# Patient Record
Sex: MALE | Race: WHITE | Employment: OTHER | URBAN - METROPOLITAN AREA
[De-identification: names, ages, dates, MRNs, and addresses within clinical notes are randomized per-mention and may not be internally consistent; named-entity substitution may affect disease eponyms.]

---

## 2017-11-01 ENCOUNTER — HOSPITAL ENCOUNTER (OUTPATIENT)
Dept: CARDIAC REHAB | Age: 72
Discharge: HOME | End: 2017-11-30
Attending: INTERNAL MEDICINE | Admitting: INTERNAL MEDICINE

## 2017-12-01 ENCOUNTER — HOSPITAL ENCOUNTER (OUTPATIENT)
Dept: CARDIAC REHAB | Age: 72
Discharge: HOME | End: 2017-12-31
Attending: INTERNAL MEDICINE | Admitting: INTERNAL MEDICINE

## 2018-07-13 ENCOUNTER — TELEPHONE (OUTPATIENT)
Dept: ORTHOPEDIC SURGERY | Age: 73
End: 2018-07-13

## 2018-07-13 PROBLEM — R63.4 WEIGHT LOSS, UNINTENTIONAL: Status: ACTIVE | Noted: 2018-07-13

## 2018-07-13 PROBLEM — R63.0 POOR APPETITE: Status: ACTIVE | Noted: 2018-07-13

## 2018-07-13 PROBLEM — R90.89 ABNORMAL CT OF BRAIN: Status: ACTIVE | Noted: 2018-07-13

## 2018-07-13 PROBLEM — R55 SYNCOPE AND COLLAPSE: Status: ACTIVE | Noted: 2018-07-13

## 2018-07-13 PROBLEM — K92.1 HEMATOCHEZIA: Status: ACTIVE | Noted: 2018-07-13

## 2018-07-13 PROBLEM — S82.892A CLOSED FRACTURE OF LEFT ANKLE: Status: ACTIVE | Noted: 2018-07-13

## 2018-07-13 PROBLEM — R42 DIZZINESS: Status: ACTIVE | Noted: 2018-07-13

## 2018-07-13 NOTE — TELEPHONE ENCOUNTER
There is a inpatient consult request for this patient at [address] [place] [address]    Consult DX: fracture of left ankle  Brandy Burrell is the RN please call [phone]    Thanks

## 2018-07-19 PROBLEM — G89.4 CHRONIC PAIN SYNDROME: Status: ACTIVE | Noted: 2018-07-19

## 2018-11-08 ENCOUNTER — HOSPITAL ENCOUNTER (OUTPATIENT)
Dept: MRI IMAGING | Age: 73
Discharge: HOME | End: 2018-11-08
Payer: OTHER GOVERNMENT

## 2018-11-08 DIAGNOSIS — H55.00 INVOLUNTARY EYE MOVEMENTS: ICD-10-CM

## 2018-11-08 PROCEDURE — 70551 MRI BRAIN STEM W/O DYE: CPT

## 2019-04-29 ENCOUNTER — HOSPITAL ENCOUNTER (OUTPATIENT)
Dept: CARDIAC CATH/INVASIVE PROCEDURES | Age: 74
Discharge: HOME | End: 2019-05-01
Attending: INTERNAL MEDICINE | Admitting: INTERNAL MEDICINE
Payer: OTHER GOVERNMENT

## 2019-04-29 ENCOUNTER — ANESTHESIA (OUTPATIENT)
Dept: CARDIAC CATH/INVASIVE PROCEDURES | Age: 74
End: 2019-04-29
Payer: OTHER GOVERNMENT

## 2019-04-29 ENCOUNTER — ANESTHESIA EVENT (OUTPATIENT)
Dept: CARDIAC CATH/INVASIVE PROCEDURES | Age: 74
End: 2019-04-29
Payer: OTHER GOVERNMENT

## 2019-04-29 VITALS
SYSTOLIC BLOOD PRESSURE: 124 MMHG | OXYGEN SATURATION: 99 % | DIASTOLIC BLOOD PRESSURE: 66 MMHG | TEMPERATURE: 97.9 F | RESPIRATION RATE: 7 BRPM

## 2019-04-29 PROBLEM — I48.0 PAF (PAROXYSMAL ATRIAL FIBRILLATION) (HCC): Status: ACTIVE | Noted: 2019-04-29

## 2019-04-29 LAB
A/G RATIO: 1.5 (ref 1.1–2.2)
ABO/RH: NORMAL
ALBUMIN SERPL-MCNC: 4.3 G/DL (ref 3.4–5)
ALP BLD-CCNC: 71 U/L (ref 40–129)
ALT SERPL-CCNC: 14 U/L (ref 10–40)
ANION GAP SERPL CALCULATED.3IONS-SCNC: 11 MMOL/L (ref 3–16)
ANTIBODY SCREEN: NORMAL
AST SERPL-CCNC: 21 U/L (ref 15–37)
BILIRUB SERPL-MCNC: 0.4 MG/DL (ref 0–1)
BUN BLDV-MCNC: 7 MG/DL (ref 7–20)
CALCIUM SERPL-MCNC: 9.1 MG/DL (ref 8.3–10.6)
CHLORIDE BLD-SCNC: 103 MMOL/L (ref 99–110)
CO2: 28 MMOL/L (ref 21–32)
CREAT SERPL-MCNC: 1.2 MG/DL (ref 0.8–1.3)
EKG ATRIAL RATE: 81 BPM
EKG DIAGNOSIS: NORMAL
EKG P-R INTERVAL: 102 MS
EKG Q-T INTERVAL: 420 MS
EKG QRS DURATION: 94 MS
EKG QTC CALCULATION (BAZETT): 487 MS
EKG R AXIS: -57 DEGREES
EKG T AXIS: 71 DEGREES
EKG VENTRICULAR RATE: 81 BPM
GFR AFRICAN AMERICAN: >60
GFR NON-AFRICAN AMERICAN: 60
GLOBULIN: 2.9 G/DL
GLUCOSE BLD-MCNC: 112 MG/DL (ref 70–99)
GLUCOSE BLD-MCNC: 151 MG/DL (ref 70–99)
HCT VFR BLD CALC: 47 % (ref 40.5–52.5)
HEMOGLOBIN: 15.7 G/DL (ref 13.5–17.5)
INR BLD: 3.13 (ref 0.86–1.14)
MAGNESIUM: 1.6 MG/DL (ref 1.8–2.4)
MCH RBC QN AUTO: 31.6 PG (ref 26–34)
MCHC RBC AUTO-ENTMCNC: 33.3 G/DL (ref 31–36)
MCV RBC AUTO: 94.7 FL (ref 80–100)
PDW BLD-RTO: 13.5 % (ref 12.4–15.4)
PERFORMED ON: ABNORMAL
PLATELET # BLD: 186 K/UL (ref 135–450)
PMV BLD AUTO: 9.2 FL (ref 5–10.5)
POC ACT LR: >400 SEC
POTASSIUM SERPL-SCNC: 4 MMOL/L (ref 3.5–5.1)
PROTHROMBIN TIME: 35.7 SEC (ref 9.8–13)
RBC # BLD: 4.97 M/UL (ref 4.2–5.9)
SODIUM BLD-SCNC: 142 MMOL/L (ref 136–145)
TOTAL PROTEIN: 7.2 G/DL (ref 6.4–8.2)
WBC # BLD: 11.7 K/UL (ref 4–11)

## 2019-04-29 PROCEDURE — 6360000002 HC RX W HCPCS

## 2019-04-29 PROCEDURE — 86900 BLOOD TYPING SEROLOGIC ABO: CPT

## 2019-04-29 PROCEDURE — 93662 INTRACARDIAC ECG (ICE): CPT

## 2019-04-29 PROCEDURE — 93613 INTRACARDIAC EPHYS 3D MAPG: CPT | Performed by: INTERNAL MEDICINE

## 2019-04-29 PROCEDURE — C1769 GUIDE WIRE: HCPCS

## 2019-04-29 PROCEDURE — 6360000002 HC RX W HCPCS: Performed by: NURSE ANESTHETIST, CERTIFIED REGISTERED

## 2019-04-29 PROCEDURE — 86901 BLOOD TYPING SEROLOGIC RH(D): CPT

## 2019-04-29 PROCEDURE — 2500000003 HC RX 250 WO HCPCS

## 2019-04-29 PROCEDURE — 93656 COMPRE EP EVAL ABLTJ ATR FIB: CPT | Performed by: INTERNAL MEDICINE

## 2019-04-29 PROCEDURE — 93623 PRGRMD STIMJ&PACG IV RX NFS: CPT

## 2019-04-29 PROCEDURE — 93623 PRGRMD STIMJ&PACG IV RX NFS: CPT | Performed by: INTERNAL MEDICINE

## 2019-04-29 PROCEDURE — 93010 ELECTROCARDIOGRAM REPORT: CPT | Performed by: INTERNAL MEDICINE

## 2019-04-29 PROCEDURE — 85027 COMPLETE CBC AUTOMATED: CPT

## 2019-04-29 PROCEDURE — 85610 PROTHROMBIN TIME: CPT

## 2019-04-29 PROCEDURE — 6360000002 HC RX W HCPCS: Performed by: NURSE PRACTITIONER

## 2019-04-29 PROCEDURE — 2580000003 HC RX 258: Performed by: NURSE ANESTHETIST, CERTIFIED REGISTERED

## 2019-04-29 PROCEDURE — 36415 COLL VENOUS BLD VENIPUNCTURE: CPT

## 2019-04-29 PROCEDURE — 7100000000 HC PACU RECOVERY - FIRST 15 MIN

## 2019-04-29 PROCEDURE — 7100000001 HC PACU RECOVERY - ADDTL 15 MIN

## 2019-04-29 PROCEDURE — 93657 TX L/R ATRIAL FIB ADDL: CPT | Performed by: INTERNAL MEDICINE

## 2019-04-29 PROCEDURE — 6360000002 HC RX W HCPCS: Performed by: ANESTHESIOLOGY

## 2019-04-29 PROCEDURE — 93656 COMPRE EP EVAL ABLTJ ATR FIB: CPT

## 2019-04-29 PROCEDURE — 2580000003 HC RX 258

## 2019-04-29 PROCEDURE — 83735 ASSAY OF MAGNESIUM: CPT

## 2019-04-29 PROCEDURE — C1759 CATH, INTRA ECHOCARDIOGRAPHY: HCPCS

## 2019-04-29 PROCEDURE — C1730 CATH, EP, 19 OR FEW ELECT: HCPCS

## 2019-04-29 PROCEDURE — 86850 RBC ANTIBODY SCREEN: CPT

## 2019-04-29 PROCEDURE — 93005 ELECTROCARDIOGRAM TRACING: CPT | Performed by: INTERNAL MEDICINE

## 2019-04-29 PROCEDURE — 6370000000 HC RX 637 (ALT 250 FOR IP): Performed by: NURSE PRACTITIONER

## 2019-04-29 PROCEDURE — 2580000003 HC RX 258: Performed by: INTERNAL MEDICINE

## 2019-04-29 PROCEDURE — 93662 INTRACARDIAC ECG (ICE): CPT | Performed by: INTERNAL MEDICINE

## 2019-04-29 PROCEDURE — 3700000001 HC ADD 15 MINUTES (ANESTHESIA)

## 2019-04-29 PROCEDURE — 93312 ECHO TRANSESOPHAGEAL: CPT

## 2019-04-29 PROCEDURE — 93320 DOPPLER ECHO COMPLETE: CPT

## 2019-04-29 PROCEDURE — 85347 COAGULATION TIME ACTIVATED: CPT

## 2019-04-29 PROCEDURE — 93325 DOPPLER ECHO COLOR FLOW MAPG: CPT

## 2019-04-29 PROCEDURE — 6370000000 HC RX 637 (ALT 250 FOR IP): Performed by: INTERNAL MEDICINE

## 2019-04-29 PROCEDURE — C1732 CATH, EP, DIAG/ABL, 3D/VECT: HCPCS

## 2019-04-29 PROCEDURE — 2500000003 HC RX 250 WO HCPCS: Performed by: NURSE ANESTHETIST, CERTIFIED REGISTERED

## 2019-04-29 PROCEDURE — 2720000010 HC SURG SUPPLY STERILE

## 2019-04-29 PROCEDURE — 93657 TX L/R ATRIAL FIB ADDL: CPT

## 2019-04-29 PROCEDURE — 80053 COMPREHEN METABOLIC PANEL: CPT

## 2019-04-29 PROCEDURE — C1894 INTRO/SHEATH, NON-LASER: HCPCS

## 2019-04-29 PROCEDURE — 3700000000 HC ANESTHESIA ATTENDED CARE

## 2019-04-29 PROCEDURE — 93613 INTRACARDIAC EPHYS 3D MAPG: CPT

## 2019-04-29 RX ORDER — BUSPIRONE HYDROCHLORIDE 5 MG/1
10 TABLET ORAL 2 TIMES DAILY
Status: DISCONTINUED | OUTPATIENT
Start: 2019-04-29 | End: 2019-05-01 | Stop reason: HOSPADM

## 2019-04-29 RX ORDER — ONDANSETRON 2 MG/ML
4 INJECTION INTRAMUSCULAR; INTRAVENOUS
Status: ACTIVE | OUTPATIENT
Start: 2019-04-29 | End: 2019-04-29

## 2019-04-29 RX ORDER — SODIUM CHLORIDE 0.9 % (FLUSH) 0.9 %
10 SYRINGE (ML) INJECTION EVERY 12 HOURS SCHEDULED
Status: DISCONTINUED | OUTPATIENT
Start: 2019-04-29 | End: 2019-05-01 | Stop reason: HOSPADM

## 2019-04-29 RX ORDER — MORPHINE SULFATE 2 MG/ML
2 INJECTION, SOLUTION INTRAMUSCULAR; INTRAVENOUS ONCE
Status: COMPLETED | OUTPATIENT
Start: 2019-04-29 | End: 2019-04-29

## 2019-04-29 RX ORDER — PRAVASTATIN SODIUM 40 MG
80 TABLET ORAL NIGHTLY
Status: DISCONTINUED | OUTPATIENT
Start: 2019-04-29 | End: 2019-05-01 | Stop reason: HOSPADM

## 2019-04-29 RX ORDER — SUCCINYLCHOLINE/SOD CL,ISO/PF 200MG/10ML
SYRINGE (ML) INTRAVENOUS PRN
Status: DISCONTINUED | OUTPATIENT
Start: 2019-04-29 | End: 2019-04-29 | Stop reason: SDUPTHER

## 2019-04-29 RX ORDER — DULOXETIN HYDROCHLORIDE 60 MG/1
60 CAPSULE, DELAYED RELEASE ORAL DAILY
Status: DISCONTINUED | OUTPATIENT
Start: 2019-04-30 | End: 2019-05-01 | Stop reason: HOSPADM

## 2019-04-29 RX ORDER — DOCUSATE SODIUM 100 MG/1
100 CAPSULE, LIQUID FILLED ORAL 2 TIMES DAILY PRN
Status: DISCONTINUED | OUTPATIENT
Start: 2019-04-29 | End: 2019-05-01 | Stop reason: HOSPADM

## 2019-04-29 RX ORDER — MIDAZOLAM HYDROCHLORIDE 1 MG/ML
INJECTION INTRAMUSCULAR; INTRAVENOUS PRN
Status: DISCONTINUED | OUTPATIENT
Start: 2019-04-29 | End: 2019-04-29 | Stop reason: SDUPTHER

## 2019-04-29 RX ORDER — PROTAMINE SULFATE 10 MG/ML
INJECTION, SOLUTION INTRAVENOUS PRN
Status: DISCONTINUED | OUTPATIENT
Start: 2019-04-29 | End: 2019-04-29 | Stop reason: SDUPTHER

## 2019-04-29 RX ORDER — HYDRALAZINE HYDROCHLORIDE 20 MG/ML
5 INJECTION INTRAMUSCULAR; INTRAVENOUS EVERY 10 MIN PRN
Status: DISCONTINUED | OUTPATIENT
Start: 2019-04-29 | End: 2019-05-01 | Stop reason: HOSPADM

## 2019-04-29 RX ORDER — TESTOSTERONE CYPIONATE 200 MG/ML
150 INJECTION INTRAMUSCULAR SEE ADMIN INSTRUCTIONS
Status: DISCONTINUED | OUTPATIENT
Start: 2019-04-29 | End: 2019-04-29 | Stop reason: RX

## 2019-04-29 RX ORDER — LIDOCAINE HYDROCHLORIDE 20 MG/ML
INJECTION, SOLUTION INTRAVENOUS PRN
Status: DISCONTINUED | OUTPATIENT
Start: 2019-04-29 | End: 2019-04-29 | Stop reason: SDUPTHER

## 2019-04-29 RX ORDER — ACETAMINOPHEN 325 MG/1
650 TABLET ORAL EVERY 4 HOURS PRN
Status: DISCONTINUED | OUTPATIENT
Start: 2019-04-29 | End: 2019-05-01 | Stop reason: HOSPADM

## 2019-04-29 RX ORDER — ONDANSETRON 2 MG/ML
INJECTION INTRAMUSCULAR; INTRAVENOUS PRN
Status: DISCONTINUED | OUTPATIENT
Start: 2019-04-29 | End: 2019-04-29 | Stop reason: SDUPTHER

## 2019-04-29 RX ORDER — DEXAMETHASONE SODIUM PHOSPHATE 4 MG/ML
INJECTION, SOLUTION INTRA-ARTICULAR; INTRALESIONAL; INTRAMUSCULAR; INTRAVENOUS; SOFT TISSUE PRN
Status: DISCONTINUED | OUTPATIENT
Start: 2019-04-29 | End: 2019-04-29 | Stop reason: SDUPTHER

## 2019-04-29 RX ORDER — OXYCODONE HYDROCHLORIDE AND ACETAMINOPHEN 5; 325 MG/1; MG/1
1 TABLET ORAL
Status: DISPENSED | OUTPATIENT
Start: 2019-04-29 | End: 2019-04-29

## 2019-04-29 RX ORDER — SODIUM CHLORIDE 9 MG/ML
INJECTION, SOLUTION INTRAVENOUS CONTINUOUS PRN
Status: DISCONTINUED | OUTPATIENT
Start: 2019-04-29 | End: 2019-04-29 | Stop reason: SDUPTHER

## 2019-04-29 RX ORDER — NICOTINE 21 MG/24HR
1 PATCH, TRANSDERMAL 24 HOURS TRANSDERMAL DAILY
Status: DISCONTINUED | OUTPATIENT
Start: 2019-04-29 | End: 2019-05-01 | Stop reason: HOSPADM

## 2019-04-29 RX ORDER — LABETALOL HYDROCHLORIDE 5 MG/ML
5 INJECTION, SOLUTION INTRAVENOUS EVERY 10 MIN PRN
Status: DISCONTINUED | OUTPATIENT
Start: 2019-04-29 | End: 2019-05-01 | Stop reason: HOSPADM

## 2019-04-29 RX ORDER — FUROSEMIDE 10 MG/ML
INJECTION INTRAMUSCULAR; INTRAVENOUS PRN
Status: DISCONTINUED | OUTPATIENT
Start: 2019-04-29 | End: 2019-04-29 | Stop reason: SDUPTHER

## 2019-04-29 RX ORDER — OXYCODONE HYDROCHLORIDE 5 MG/1
10 TABLET ORAL EVERY 4 HOURS PRN
Status: DISCONTINUED | OUTPATIENT
Start: 2019-04-29 | End: 2019-04-29

## 2019-04-29 RX ORDER — OXYCODONE HCL 20 MG/1
20 TABLET, FILM COATED, EXTENDED RELEASE ORAL DAILY
Status: DISCONTINUED | OUTPATIENT
Start: 2019-04-29 | End: 2019-05-01 | Stop reason: HOSPADM

## 2019-04-29 RX ORDER — HYDROMORPHONE HCL 110MG/55ML
0.25 PATIENT CONTROLLED ANALGESIA SYRINGE INTRAVENOUS EVERY 5 MIN PRN
Status: COMPLETED | OUTPATIENT
Start: 2019-04-29 | End: 2019-04-29

## 2019-04-29 RX ORDER — METOPROLOL SUCCINATE 50 MG/1
25 TABLET, EXTENDED RELEASE ORAL DAILY
Status: DISCONTINUED | OUTPATIENT
Start: 2019-04-30 | End: 2019-05-01 | Stop reason: HOSPADM

## 2019-04-29 RX ORDER — OXYCODONE HYDROCHLORIDE 5 MG/1
5 TABLET ORAL EVERY 4 HOURS PRN
Status: DISCONTINUED | OUTPATIENT
Start: 2019-04-29 | End: 2019-05-01 | Stop reason: HOSPADM

## 2019-04-29 RX ORDER — PANTOPRAZOLE SODIUM 40 MG/1
40 TABLET, DELAYED RELEASE ORAL DAILY
Status: DISCONTINUED | OUTPATIENT
Start: 2019-04-30 | End: 2019-05-01 | Stop reason: HOSPADM

## 2019-04-29 RX ORDER — POLYVINYL ALCOHOL 14 MG/ML
1 SOLUTION/ DROPS OPHTHALMIC 4 TIMES DAILY PRN
Status: DISCONTINUED | OUTPATIENT
Start: 2019-04-29 | End: 2019-05-01 | Stop reason: HOSPADM

## 2019-04-29 RX ORDER — EPHEDRINE SULFATE/0.9% NACL/PF 50 MG/5 ML
SYRINGE (ML) INTRAVENOUS PRN
Status: DISCONTINUED | OUTPATIENT
Start: 2019-04-29 | End: 2019-04-29 | Stop reason: SDUPTHER

## 2019-04-29 RX ORDER — WARFARIN SODIUM 1 MG/1
2 TABLET ORAL EVERY EVENING
Status: DISCONTINUED | OUTPATIENT
Start: 2019-04-29 | End: 2019-04-30

## 2019-04-29 RX ORDER — AMIODARONE HYDROCHLORIDE 200 MG/1
100 TABLET ORAL DAILY
Status: DISCONTINUED | OUTPATIENT
Start: 2019-04-30 | End: 2019-05-01 | Stop reason: HOSPADM

## 2019-04-29 RX ORDER — HEPARIN SODIUM 1000 [USP'U]/ML
INJECTION, SOLUTION INTRAVENOUS; SUBCUTANEOUS PRN
Status: DISCONTINUED | OUTPATIENT
Start: 2019-04-29 | End: 2019-04-29 | Stop reason: SDUPTHER

## 2019-04-29 RX ORDER — PRAMIPEXOLE DIHYDROCHLORIDE 0.25 MG/1
0.25 TABLET ORAL 3 TIMES DAILY
Status: DISCONTINUED | OUTPATIENT
Start: 2019-04-29 | End: 2019-05-01 | Stop reason: HOSPADM

## 2019-04-29 RX ORDER — FENTANYL CITRATE 50 UG/ML
INJECTION, SOLUTION INTRAMUSCULAR; INTRAVENOUS PRN
Status: DISCONTINUED | OUTPATIENT
Start: 2019-04-29 | End: 2019-04-29 | Stop reason: SDUPTHER

## 2019-04-29 RX ORDER — PREGABALIN 100 MG/1
200 CAPSULE ORAL 2 TIMES DAILY
Status: DISCONTINUED | OUTPATIENT
Start: 2019-04-29 | End: 2019-05-01 | Stop reason: HOSPADM

## 2019-04-29 RX ORDER — SODIUM CHLORIDE 0.9 % (FLUSH) 0.9 %
10 SYRINGE (ML) INJECTION PRN
Status: DISCONTINUED | OUTPATIENT
Start: 2019-04-29 | End: 2019-05-01 | Stop reason: HOSPADM

## 2019-04-29 RX ORDER — LANOLIN ALCOHOL/MO/W.PET/CERES
6 CREAM (GRAM) TOPICAL NIGHTLY
Status: DISCONTINUED | OUTPATIENT
Start: 2019-04-29 | End: 2019-05-01 | Stop reason: HOSPADM

## 2019-04-29 RX ORDER — MEPERIDINE HYDROCHLORIDE 25 MG/ML
12.5 INJECTION INTRAMUSCULAR; INTRAVENOUS; SUBCUTANEOUS EVERY 5 MIN PRN
Status: DISCONTINUED | OUTPATIENT
Start: 2019-04-29 | End: 2019-04-29

## 2019-04-29 RX ORDER — ASPIRIN 81 MG/1
81 TABLET ORAL DAILY
Status: DISCONTINUED | OUTPATIENT
Start: 2019-04-30 | End: 2019-05-01 | Stop reason: HOSPADM

## 2019-04-29 RX ADMIN — Medication 10 MG: at 07:51

## 2019-04-29 RX ADMIN — SODIUM CHLORIDE: 9 INJECTION, SOLUTION INTRAVENOUS at 08:25

## 2019-04-29 RX ADMIN — PREGABALIN 200 MG: 100 CAPSULE ORAL at 20:48

## 2019-04-29 RX ADMIN — HYDROMORPHONE HYDROCHLORIDE 0.25 MG: 2 INJECTION, SOLUTION INTRAMUSCULAR; INTRAVENOUS; SUBCUTANEOUS at 10:35

## 2019-04-29 RX ADMIN — WARFARIN SODIUM 2 MG: 1 TABLET ORAL at 17:30

## 2019-04-29 RX ADMIN — OXYCODONE HYDROCHLORIDE 5 MG: 5 TABLET ORAL at 17:30

## 2019-04-29 RX ADMIN — HYDROMORPHONE HYDROCHLORIDE 0.25 MG: 2 INJECTION, SOLUTION INTRAMUSCULAR; INTRAVENOUS; SUBCUTANEOUS at 10:23

## 2019-04-29 RX ADMIN — ISOPROTERENOL HYDROCHLORIDE 10 MCG/MIN: 0.2 INJECTION, SOLUTION INTRAMUSCULAR; INTRAVENOUS at 09:18

## 2019-04-29 RX ADMIN — PROTAMINE SULFATE 30 MG: 10 INJECTION, SOLUTION INTRAVENOUS at 09:46

## 2019-04-29 RX ADMIN — SODIUM CHLORIDE: 9 INJECTION, SOLUTION INTRAVENOUS at 07:32

## 2019-04-29 RX ADMIN — HEPARIN SODIUM 7000 UNITS: 1000 INJECTION INTRAVENOUS; SUBCUTANEOUS at 08:24

## 2019-04-29 RX ADMIN — OXYCODONE HYDROCHLORIDE 20 MG: 20 TABLET, FILM COATED, EXTENDED RELEASE ORAL at 20:47

## 2019-04-29 RX ADMIN — HYDROMORPHONE HYDROCHLORIDE 0.25 MG: 2 INJECTION, SOLUTION INTRAMUSCULAR; INTRAVENOUS; SUBCUTANEOUS at 10:28

## 2019-04-29 RX ADMIN — FUROSEMIDE 20 MG: 10 INJECTION, SOLUTION INTRAMUSCULAR; INTRAVENOUS at 09:46

## 2019-04-29 RX ADMIN — PRAMIPEXOLE DIHYDROCHLORIDE 0.25 MG: 0.25 TABLET ORAL at 16:08

## 2019-04-29 RX ADMIN — MORPHINE SULFATE 2 MG: 2 INJECTION, SOLUTION INTRAMUSCULAR; INTRAVENOUS at 16:08

## 2019-04-29 RX ADMIN — PHENYLEPHRINE HYDROCHLORIDE 100 MCG/MIN: 10 INJECTION INTRAVENOUS at 07:47

## 2019-04-29 RX ADMIN — ONDANSETRON 4 MG: 2 INJECTION INTRAMUSCULAR; INTRAVENOUS at 09:46

## 2019-04-29 RX ADMIN — OXYCODONE HYDROCHLORIDE 10 MG: 5 TABLET ORAL at 12:59

## 2019-04-29 RX ADMIN — Medication 10 MG: at 07:46

## 2019-04-29 RX ADMIN — PHENYLEPHRINE HYDROCHLORIDE 100 MCG/MIN: 10 INJECTION INTRAVENOUS at 08:16

## 2019-04-29 RX ADMIN — FENTANYL CITRATE 100 MCG: 50 INJECTION INTRAMUSCULAR; INTRAVENOUS at 07:38

## 2019-04-29 RX ADMIN — LIDOCAINE HYDROCHLORIDE 100 MG: 20 INJECTION, SOLUTION INTRAVENOUS at 07:38

## 2019-04-29 RX ADMIN — MELATONIN TAB 3 MG 6 MG: 3 TAB at 20:48

## 2019-04-29 RX ADMIN — BUSPIRONE HYDROCHLORIDE 10 MG: 5 TABLET ORAL at 20:48

## 2019-04-29 RX ADMIN — Medication 140 MG: at 07:38

## 2019-04-29 RX ADMIN — DEXAMETHASONE SODIUM PHOSPHATE 4 MG: 4 INJECTION, SOLUTION INTRA-ARTICULAR; INTRALESIONAL; INTRAMUSCULAR; INTRAVENOUS; SOFT TISSUE at 08:14

## 2019-04-29 RX ADMIN — MIDAZOLAM HYDROCHLORIDE 2 MG: 1 INJECTION, SOLUTION INTRAMUSCULAR; INTRAVENOUS at 07:35

## 2019-04-29 RX ADMIN — PRAMIPEXOLE DIHYDROCHLORIDE 0.25 MG: 0.25 TABLET ORAL at 20:48

## 2019-04-29 RX ADMIN — Medication 10 MG: at 08:08

## 2019-04-29 RX ADMIN — METFORMIN HYDROCHLORIDE 500 MG: 500 TABLET ORAL at 17:30

## 2019-04-29 RX ADMIN — Medication 10 ML: at 20:48

## 2019-04-29 RX ADMIN — HYDROMORPHONE HYDROCHLORIDE 0.25 MG: 2 INJECTION, SOLUTION INTRAMUSCULAR; INTRAVENOUS; SUBCUTANEOUS at 10:18

## 2019-04-29 ASSESSMENT — PULMONARY FUNCTION TESTS
PIF_VALUE: 21
PIF_VALUE: 22
PIF_VALUE: 21
PIF_VALUE: 21
PIF_VALUE: 20
PIF_VALUE: 21
PIF_VALUE: 22
PIF_VALUE: 21
PIF_VALUE: 2
PIF_VALUE: 22
PIF_VALUE: 21
PIF_VALUE: 21
PIF_VALUE: 2
PIF_VALUE: 21
PIF_VALUE: 3
PIF_VALUE: 2
PIF_VALUE: 21
PIF_VALUE: 22
PIF_VALUE: 22
PIF_VALUE: 21
PIF_VALUE: 20
PIF_VALUE: 22
PIF_VALUE: 21
PIF_VALUE: 20
PIF_VALUE: 22
PIF_VALUE: 21
PIF_VALUE: 4
PIF_VALUE: 2
PIF_VALUE: 22
PIF_VALUE: 21
PIF_VALUE: 18
PIF_VALUE: 2
PIF_VALUE: 3
PIF_VALUE: 6
PIF_VALUE: 2
PIF_VALUE: 21
PIF_VALUE: 17
PIF_VALUE: 20
PIF_VALUE: 1
PIF_VALUE: 21
PIF_VALUE: 22
PIF_VALUE: 19
PIF_VALUE: 1
PIF_VALUE: 21
PIF_VALUE: 22
PIF_VALUE: 21
PIF_VALUE: 1
PIF_VALUE: 21
PIF_VALUE: 4
PIF_VALUE: 22
PIF_VALUE: 22
PIF_VALUE: 21
PIF_VALUE: 2
PIF_VALUE: 21
PIF_VALUE: 22
PIF_VALUE: 21
PIF_VALUE: 22
PIF_VALUE: 1
PIF_VALUE: 22
PIF_VALUE: 21
PIF_VALUE: 22
PIF_VALUE: 4
PIF_VALUE: 21
PIF_VALUE: 20
PIF_VALUE: 22
PIF_VALUE: 21
PIF_VALUE: 6
PIF_VALUE: 6
PIF_VALUE: 22
PIF_VALUE: 1
PIF_VALUE: 21
PIF_VALUE: 20
PIF_VALUE: 21
PIF_VALUE: 18
PIF_VALUE: 20
PIF_VALUE: 22
PIF_VALUE: 22
PIF_VALUE: 21
PIF_VALUE: 21
PIF_VALUE: 2
PIF_VALUE: 21
PIF_VALUE: 21
PIF_VALUE: 1
PIF_VALUE: 21
PIF_VALUE: 18
PIF_VALUE: 21
PIF_VALUE: 21
PIF_VALUE: 23
PIF_VALUE: 2
PIF_VALUE: 22
PIF_VALUE: 21
PIF_VALUE: 21
PIF_VALUE: 22
PIF_VALUE: 21
PIF_VALUE: 21
PIF_VALUE: 6
PIF_VALUE: 2
PIF_VALUE: 21
PIF_VALUE: 17
PIF_VALUE: 17
PIF_VALUE: 1
PIF_VALUE: 21
PIF_VALUE: 21
PIF_VALUE: 22
PIF_VALUE: 17
PIF_VALUE: 21
PIF_VALUE: 22
PIF_VALUE: 22
PIF_VALUE: 21
PIF_VALUE: 22
PIF_VALUE: 21
PIF_VALUE: 22
PIF_VALUE: 21
PIF_VALUE: 22
PIF_VALUE: 21
PIF_VALUE: 20
PIF_VALUE: 21
PIF_VALUE: 21
PIF_VALUE: 20
PIF_VALUE: 17
PIF_VALUE: 21
PIF_VALUE: 6
PIF_VALUE: 22
PIF_VALUE: 21
PIF_VALUE: 1
PIF_VALUE: 25

## 2019-04-29 ASSESSMENT — PAIN DESCRIPTION - LOCATION
LOCATION: BACK

## 2019-04-29 ASSESSMENT — PAIN SCALES - GENERAL
PAINLEVEL_OUTOF10: 0
PAINLEVEL_OUTOF10: 8
PAINLEVEL_OUTOF10: 7
PAINLEVEL_OUTOF10: 7
PAINLEVEL_OUTOF10: 10
PAINLEVEL_OUTOF10: 10
PAINLEVEL_OUTOF10: 0
PAINLEVEL_OUTOF10: 10
PAINLEVEL_OUTOF10: 8

## 2019-04-29 ASSESSMENT — PAIN DESCRIPTION - PAIN TYPE
TYPE: CHRONIC PAIN

## 2019-04-29 ASSESSMENT — LIFESTYLE VARIABLES: SMOKING_STATUS: 0

## 2019-04-29 NOTE — ANESTHESIA POSTPROCEDURE EVALUATION
Department of Anesthesiology  Postprocedure Note    Patient: Nacho Agustin  MRN: [id number]  YOB: 1945  Date of evaluation: 4/29/2019  Time:  10:29 AM     Procedure Summary     Date:  04/29/19 Room / Location:  [facility] Cath Lab; [facility] Echocardiography    Anesthesia Start:  0730 Anesthesia Stop:  8969    Procedure:  ECHO MARGIE IN CARDIAC CATH Diagnosis:       Paroxysmal atrial fibrillation      PAF (paroxysmal atrial fibrillation) ([facility] [address]    Scheduled Providers:   Responsible Provider:  Micah Damian MD    Anesthesia Type:  general ASA Status:  3          Anesthesia Type: general    Tessie Phase I: Tessie Score: 8    Tessie Phase II:      Last vitals: Reviewed and per EMR flowsheets.        Anesthesia Post Evaluation    Patient location during evaluation: PACU  Patient participation: complete - patient participated  Level of consciousness: awake and alert  Airway patency: patent  Nausea & Vomiting: no vomiting and no nausea  Complications: no  Cardiovascular status: hemodynamically stable  Respiratory status: acceptable  Hydration status: stable

## 2019-04-29 NOTE — PROGRESS NOTES
Pt c/o feeling \"pain in my right arm and shoulder and down to my abdomen,\" pt also reports pain in left shoulder. Pt describes pain as \"skeletal,\" nursing staff providing emotional support, see MAR for medication interventions. Dr. Alex Allan notified, no new orders. VSS. Will continue to monitor.

## 2019-04-29 NOTE — PROGRESS NOTES
Clinical Pharmacist Note:    Pharmacy consulted by Dr. Chip Lynch to dose warfarin for hx of atrial fibrillation. Goal INR range: 2.0-3.0    INR today: 3.13  Plan to start warfarin at home dose of 2 mg daily. Daily INR is ordered. Pharmacy will continue to follow.      Leobardo Li, PharmD, [address]   [id number]

## 2019-04-29 NOTE — PROGRESS NOTES
Patient brought over to PACU from cath lab and attached to CVU monitoring. Report reviewed with cath lab RN. Right groin with figure eight stitch intact,  soft and no hematoma or oozing noted. Occlusive dressing dry and intact. Pedal pulses easily palpated.

## 2019-04-29 NOTE — H&P
[address]   Electrophysiology   Date: 4/29/2019  Reason for Consultation: Atrial fibrillation   Consult Requesting Physician: Antwan Stout MD     No chief complaint on file. HPI: Livia Todd is a 72 y.o. male with PAF, had ablation in 2011 and  5/2017, had atrial flutter in July 2018 and passed out. On amiodarone       Past Medical History:   Diagnosis Date    Arthritis     Diabetes mellitus ([address]     History of blood transfusion     Hyperlipidemia     Hypertension     Psychiatric problem     PTSD    Thyroid disease     HARJIT    Past Surgical History:   Procedure Laterality Date    COLONOSCOPY      ENDOSCOPY, COLON, DIAGNOSTIC      EYE SURGERY      Left eye cornia and cataracts    FRACTURE SURGERY      Left hand    PACEMAKER PLACEMENT      SKIN BIOPSY      Right forearm       Allergies   Allergen Reactions    Zolpidem      Amnesia, nightmares from past war experiences       Social History:  Reviewed. reports that he has quit smoking. He has never used smokeless tobacco. He reports that he drinks alcohol. He reports that he does not use drugs. Family History:  Reviewed. family history includes Heart Attack in his brother and father; Stroke in his mother. Review of System:  All other systems reviewed and are negative except for that noted above.  Pertinent negatives are:     · General: negative for fever, chills   · Ophthalmic ROS: negative for - eye pain or loss of vision  · ENT ROS: negative for - headaches, sore throat   · Respiratory: negative for - cough, sputum  · Cardiovascular: Reviewed in HPI  · Gastrointestinal: negative for - abdominal pain, diarrhea, N/V  · Hematology: negative for - bleeding, blood clots, bruising or jaundice  · Genito-Urinary:  negative for - Dysuria or incontinence  · Musculoskeletal: negative for - Joint swelling, muscle pain  · Neurological: negative for - confusion, dizziness, headaches   · Psychiatric: No anxiety, no 07/14/2018    TRIG 258 07/14/2018             Meds:.    metoprolol succinate  25 mg Oral Daily    amiodarone  200 mg Oral Daily    sodium chloride flush  10 mL Intravenous 2 times per day    metFORMIN  500 mg Oral BID WC    insulin lispro  0-6 Units Subcutaneous TID     insulin lispro  0-3 Units Subcutaneous Nightly    warfarin (COUMADIN) daily dosing (placeholder)   Other RX Placeholder    DULoxetine  60 mg Oral Daily    melatonin  6 mg Oral Nightly    oxyCODONE  20 mg Oral Nightly    pramipexole  0.25 mg Oral TID WC    pravastatin  80 mg Oral Nightly    pregabalin  200 mg Oral BID    busPIRone  10 mg Oral BID    levothyroxine  137 mcg Oral Daily    aspirin  81 mg Oral Daily    famotidine  20 mg Oral BID           Patient Active Problem List    Diagnosis Date Noted    PA[facility]ox[facility]mal [facility] fibrillation[facility] 04/29/2019    Chronic pain syndrome 07/19/2018    Bradycardia     Closed nondisplaced avulsion fracture of left talus     Syncope 07/13/2018    Closed fracture of left ankle 07/13/2018    Dizziness 07/13/2018    Hematochezia 07/13/2018    Weight loss, unintentional 07/13/2018    Poor appetite 07/13/2018    Abnormal CT of brain 07/13/2018    Atrial fibrillation, rapid ([facility] [address]     Essential hypertension       Active Hospital Problems    Diagnosis Date Noted    PAF (paroxysmal atrial fibrillation) ([facility] [address] [I48.0] 04/29/2019       Assessment:       Plan:    - paroxysmal atrial fibrillation  The risks, benefits and alternatives of the ablation procedure were discussed with the patient. The risks including, but not limited to, the risks of bleeding, infection, radiation exposure, injury to vascular, cardiac and surrounding structures (including pneumothorax), stroke, cardiac perforation, tamponade, need for emergent open heart surgery, need for pacemaker implantation, Injury to the phrenic nerve, injury to the esophagus, myocardial infarction and death were discussed in detail. The patient opted to proceed with the ablation. - atrial flutter   As above    - HTN   Continue current treatment. - Permanent pacemaker   The CIED was interrogated and programmed and I supervised and reviewed all the data. All findings and changes are in device interrogation sheat and reflect my personal interpretation and changes and is scanned to Epic. Thank you for allowing me to participate in the care of [address] [place] [address]     NOTE: This report was transcribed using voice recognition software. Every effort was made to ensure accuracy, however, inadvertent computerized transcription errors may be present.

## 2019-04-29 NOTE — PROGRESS NOTES
Pharmacy ran [place] report for the patient:     -Oxycodone ER 20 mg nightly  -Oxycodone IR 5 mg q5h PRN  -Lyrica 200 mg BID      Message sent to [facility] with EP to clarify if home medications can be resumed at home dose.      Roseline Sanchez, PharmD, [address]   [id number] No

## 2019-04-29 NOTE — PLAN OF CARE
Post procedure site check completed. A small amount of blood was visible under dressing. Site was not actively bleeding, but was tender to touch. Surgical site is within normal limits and appears to be free of complications. All concerns were reviewed and questions answered. Patient verbalized understanding.

## 2019-04-29 NOTE — ANESTHESIA PRE PROCEDURE
Department of Anesthesiology  Preprocedure Note       Name:  Ghassan Juarez   Age:  72 y.o.  :  1945                                          MRN:  [id number]         Date:  2019      Surgeon: * Surgery not found *    Procedure: ECHO MARGIE IN CARDIAC CATH    Medications prior to admission:   Prior to Admission medications    Medication Sig Start Date End Date Taking? Authorizing Provider   amiodarone (CORDARONE) 200 MG tablet Take 2 tab BID for for 5 days , then 1 tab dialy 18   Cielo Webb MD   pregabalin (LYRICA) 100 MG capsule Take 2 capsules by mouth 2 times daily for 30 days. . 18  Cielo Webb MD   metoprolol succinate (TOPROL XL) 25 MG extended release tablet Take 1 tablet by mouth daily 18   Cielo Webb MD   docusate sodium (COLACE, DULCOLAX) 100 MG CAPS Take 100 mg by mouth 2 times daily as needed for Constipation 18   Cielo Webb MD   pramipexole (MIRAPEX) 0.25 MG tablet Take 0.25 mg by mouth 3 times daily Take 1 in the morning, 1 at 5pm, and 1 at bedtime  May take 1 tablet prn for breakthrough symptoms of restless leg syndrome    Historical Provider, MD   testosterone cypionate (DEPOTESTOTERONE CYPIONATE) 200 MG/ML injection Inject 150 mg into the muscle See Admin Instructions. .    Historical Provider, MD   DULoxetine (CYMBALTA) 60 MG extended release capsule Take 60 mg by mouth daily    Historical Provider, MD   aspirin 81 MG EC tablet Take 81 mg by mouth daily    Historical Provider, MD   pantoprazole (PROTONIX) 20 MG tablet Take 20 mg by mouth daily    Historical Provider, MD   metFORMIN (GLUCOPHAGE) 500 MG tablet Take 500 mg by mouth 2 times daily (with meals)    Historical Provider, MD   busPIRone (BUSPAR) 10 MG tablet Take 10 mg by mouth 2 times daily    Historical Provider, MD   pravastatin (PRAVACHOL) 80 MG tablet Take 80 mg by mouth daily    Historical Provider, MD   levothyroxine (SYNTHROID) 137 MCG tablet Take 137 mcg by mouth Daily    Historical Provider, MD   melatonin 3 MG TABS tablet Take 6 mg by mouth nightly May take 1 additional tablet in 1 hour if no effect    Historical Provider, MD   polyethyl glycol-propyl glycol 0.4-0.3 % (SYSTANE) 0.4-0.3 % ophthalmic solution Place 1-2 drops into both eyes 4 times daily as needed for Dry Eyes    Historical Provider, MD   warfarin (COUMADIN) 2 MG tablet Take 2 mg by mouth daily -- 4 mg every Tuesday, and 2 mg all other days of the week    Historical Provider, MD       Current medications:    No current facility-administered medications for this encounter. Allergies: Allergies   Allergen Reactions    Zolpidem      Amnesia, nightmares from past war experiences       Problem List:    Patient Active Problem List   Diagnosis Code    Syncope R55    Closed fracture of left ankle S82.892A    Dizziness R42    Hematochezia K92.1    Weight loss, unintentional R63.4    Poor appetite R63.0    Abnormal CT of brain R90.89    Atrial fibrillation, rapid (HCC) I48.91    Essential hypertension I10    Closed nondisplaced avulsion fracture of left talus S92.155A    Bradycardia R00.1    Chronic pain syndrome G89.4       Past Medical History:        Diagnosis Date    Arthritis     [facility] mellitus [facility] [address]     History of blood transfusion     Hyperlipidemia     Hypertension     Psychiatric problem     PTSD    Thyroid disease        Past Surgical History:        Procedure Laterality Date    COLONOSCOPY      ENDOSCOPY, COLON, DIAGNOSTIC      EYE SURGERY      Left eye cornia and cataracts    FRACTURE SURGERY      Left hand    PACEMAKER PLACEMENT      SKIN BIOPSY      Right forearm       Social History:    Social History     Tobacco Use    Smoking status: Former Smoker    Smokeless tobacco: Never Used   Substance Use Topics    Alcohol use: Yes     Comment: occasional                                Counseling given: Not Answered      Vital Signs (Current):  There were no vitals filed for this visit. BP Readings from Last 3 Encounters:   07/24/18 (!) 151/75       NPO Status:                                                                                 BMI:   Wt Readings from Last 3 Encounters:   07/24/18 201 lb 1 oz (91.2 kg)     There is no height or weight on file to calculate BMI.    CBC:   Lab Results   Component Value Date    WBC 6.7 07/24/2018    RBC 4.09 07/24/2018    HGB 12.1 07/24/2018    HCT 36.7 07/24/2018    MCV 89.8 07/24/2018    RDW 15.5 07/24/2018     07/24/2018       CMP:   Lab Results   Component Value Date     07/24/2018    K 3.7 07/24/2018     07/24/2018    CO2 28 07/24/2018    BUN 10 07/24/2018    CREATININE 1.0 07/24/2018    GFRAA >60 07/24/2018    AGRATIO 1.3 07/13/2018    LABGLOM >60 07/24/2018    GLUCOSE 116 07/24/2018    PROT 6.3 07/13/2018    CALCIUM 9.3 07/24/2018    BILITOT 0.3 07/13/2018    ALKPHOS 59 07/13/2018    AST 17 07/13/2018    ALT 10 07/13/2018       POC Tests: No results for input(s): POCGLU, POCNA, POCK, POCCL, POCBUN, POCHEMO, POCHCT in the last 72 hours.     Coags:   Lab Results   Component Value Date    PROTIME 29.2 07/24/2018    INR 2.56 07/24/2018    APTT 83.1 07/16/2018       HCG (If Applicable): No results found for: PREGTESTUR, PREGSERUM, HCG, HCGQUANT     ABGs:   Lab Results   Component Value Date    PHART 7.357 07/16/2018    PO2ART 58.5 07/16/2018    IOV5JZJ 48.6 07/16/2018    RLV3BTJ 26.6 07/16/2018    BEART 1.0 07/16/2018    P2BNEMMB 88.9 07/16/2018        Type & Screen (If Applicable):  No results found for: [facility]    Anesthesia Evaluation  Patient summary reviewed and Nursing notes reviewed no history of anesthetic complications:   Airway: Mallampati: III  TM distance: >3 FB   Neck ROM: full  Mouth opening: > = 3 FB Dental:    (+) upper dentures and lower dentures      Pulmonary:normal exam  breath sounds clear to auscultation  (+) sleep apnea: on noncompliant,

## 2019-04-29 NOTE — PROGRESS NOTES
Figure 8 stitch removed from right groin per orders. Slight amount of oozing noted, site soft to touch, no hematoma or bruising. Pt medicated for complaints of right shoulder pain per orders. Will monitor.

## 2019-04-29 NOTE — PROCEDURES
[facility]     Electrophysiology Procedure Note       Date of Procedure: 4/29/2019  Patient's Name: Nacho Agustin  YOB: 1945   Medical Record Number: [id number]  Referring Physician: Ninfa Sanchez MD  Procedure Performed by: Torrie Norwood MD    Procedure performed:     · Electrophysiology study with left atrial recording and mapping   · 3-D electroanatomical mapping of the left atrium and  right atium. · electrophysiological study(EPS) and induction after IV drug infusion  · Transseptal puncture through an intact septum . · Intracardiac echocardiography. · Radiofrequency ablation of atrial fibrillation and pulmonary veins isolation  · Additional ablation with creation of an anterior line,    Electronic analysis and programming of Permanent pacemaker  and re-programming of the device at the end of procedure            Indications for procedure:   Symptomatic atrial fibrillation and flutter, failed antiarrhythmic therapy and cardioversion in the past   Nacho Agustin is a 72 y.o. male   Due to failure of antiarrhythmic therapy in the past, patient has been scheduled to have redo ablation for symptomatic atrial fibrillation. Details of Procedure: The risks, benefits and alternatives of the ablation procedure were discussed with the patient. The risks including, but not limited to, the risks of bleeding, infection, radiation exposure, injury to vascular, cardiac and surrounding structures (including pneumothorax), stroke, cardiac perforation, tamponade, need for emergent open heart surgery, need for pacemaker implantation, esophageal injury and fistula, myocardial infarction and death were discussed in detail. The patient opted to proceed with the ablation. Written informed consent was signed and placed in the chart. Patient was brought to the EP lab in a fasting non-sedate state.  Patient underwent general anesthesia by anesthesia team. We initially performed a transesophageal echo that showed no left atrial appendage clot/thrombus. Permanent pacemaker was programmed and reprogrammed at beginning and end of procedure respectively. Procedure was done without use of fluoroscopy. Both groins were prepped in a sterile fashion. We gained access in Right femoral vein. A 9-French sheath for ICE and 6F sheath for CS catheter and an Sr0 sheath for ablation and mapping catheters were  placed in the right femoral vein using modified seldinger technique. Using 3-D mapping system Carto the CS cathter was placed inside the coronary sinus  for recording and mapping of the left atrium. Using ICE we delineated the left pulmonary vein, left atrial appendage,  mitral valve, and right superior and right inferior pulmonary veins. Patient received a bolus of heparin prior transseptal puncture followed by continuous monitoring of the ACT and boluses of heparin during the procedure to keep the ACT more than 350. Also an esophageal temperature probe in addition to Esophastar catheter was advanced into the esophagus for mapping of the esophagus and careful monitoring of the esophageal temperature during ablation. A transseptal puncture through intact septum was done using ICE and  pressure monitoring . We placed a SR0 Sheaths inside the left atrium. Then a Circular catheter with deflectable curve and an irrigated ablation catheter was advanced into the left atrium sequentially and alternatively as needed. Using Penta ray Carto navigation system a three dimensional electro anatomical mapping of the left atrium, in addition to right and left sided pulmonary vein was created. Using Penta ray catheter voltage mapping of the left atrium was created. Also an esophageal temperature probe in addition to Esophastar catheter was advanced into the esophagus for mapping of the esophagus and careful monitoring of the esophageal temperature during ablation. The ICE was used to monitor location of temperature probe and move it close to ablation area. We stopped ablation when  temperature  increased 1 degree. The map showed breakthrough anteriorly on the left side. Ablation was done until veins were isolated. Also ablation in posteroinferior of RIPV was done. There was an incomplete anterior line which was then ablated and confirmed by pacing from LA appendage and activation mapping. Roof line was also confirmed by pacing posterior wall. CTI line was also confirmed by pacing from CS Os. . The power was limited to 36  W           ==========================================     Following confirmation of pulmonary veins isolation by Penta ray/ catheter, isuprel IV was started and increased to 10 mcg to check for pulmonary veins reconnection and induction of any arrhythmia. No arrhythmia was induced. Burst pacing and programmed stimulation with up to three premature atrial stimuli did not induce any sustained arrhythmia. After ablation we removed the catheters and sheaths from left atrium and performed EP study and programmed stimulation using our CS catheter and ablation cathter to assess for other arrhythmias. The deca polar/ablation catheter were moved from CS to right atrium, RV apex, and His bundle position. His bundle potentials was recorded and pacing was performed from right atrium, coronary sinus and RV apex with the following results:     AH interval was 169 msec  HV interval was 47 msec  Pacing from atrium, using CS catheter which was moved, showed 1:1 conduction over AV node with (AV wenckebach) was 400 msec  Pacing from atrium, AV john ERP was 600/350 msec   Pacing from RV apex, using CS cathter which was moved to the RV apex showed no 1:1 conduction over AV node (VA wenckebach) at 600 mec  Pacing from RV apex, showed VERP of 600/300 msec. At the end of the procedure, using ICE we confirmed the lack of any pericardial effusion. Figure of 8 suture was used and sheaths were removed using manual compression. The patient tolerated the procedure well and there were no complications. Patient was extubated and transferred to the floor in stable condition. Conclusion:     - Pulmonary vein isolations using wide area circumferential radiofrequency ablation    - Additional ablation of creation of anterior line,   Plan:   The patient will be admitted overnight to CVU. He will receive usual post ablation care.      Nely Kincaid MD,   [facility]   Office: [phone]

## 2019-04-30 LAB
INR BLD: 3.36 (ref 0.86–1.14)
PROTHROMBIN TIME: 38.3 SEC (ref 9.8–13)

## 2019-04-30 PROCEDURE — 2580000003 HC RX 258: Performed by: INTERNAL MEDICINE

## 2019-04-30 PROCEDURE — 6370000000 HC RX 637 (ALT 250 FOR IP): Performed by: INTERNAL MEDICINE

## 2019-04-30 PROCEDURE — 6370000000 HC RX 637 (ALT 250 FOR IP): Performed by: NURSE PRACTITIONER

## 2019-04-30 PROCEDURE — 85610 PROTHROMBIN TIME: CPT

## 2019-04-30 RX ORDER — ACETAMINOPHEN 80 MG
TABLET,CHEWABLE ORAL
Status: COMPLETED
Start: 2019-04-30 | End: 2019-04-30

## 2019-04-30 RX ADMIN — MELATONIN TAB 3 MG 6 MG: 3 TAB at 21:05

## 2019-04-30 RX ADMIN — ACETAMINOPHEN 650 MG: 325 TABLET, FILM COATED ORAL at 14:43

## 2019-04-30 RX ADMIN — LEVOTHYROXINE SODIUM 137 MCG: 25 TABLET ORAL at 05:42

## 2019-04-30 RX ADMIN — METFORMIN HYDROCHLORIDE 500 MG: 500 TABLET ORAL at 18:21

## 2019-04-30 RX ADMIN — METOPROLOL SUCCINATE 25 MG: 50 TABLET, FILM COATED, EXTENDED RELEASE ORAL at 09:05

## 2019-04-30 RX ADMIN — PRAMIPEXOLE DIHYDROCHLORIDE 0.25 MG: 0.25 TABLET ORAL at 14:15

## 2019-04-30 RX ADMIN — DULOXETINE HYDROCHLORIDE 60 MG: 60 CAPSULE, DELAYED RELEASE ORAL at 09:03

## 2019-04-30 RX ADMIN — PREGABALIN 200 MG: 100 CAPSULE ORAL at 21:05

## 2019-04-30 RX ADMIN — ASPIRIN 81 MG: 81 TABLET, COATED ORAL at 09:03

## 2019-04-30 RX ADMIN — OXYCODONE HYDROCHLORIDE 5 MG: 5 TABLET ORAL at 10:08

## 2019-04-30 RX ADMIN — OXYCODONE HYDROCHLORIDE 20 MG: 20 TABLET, FILM COATED, EXTENDED RELEASE ORAL at 21:12

## 2019-04-30 RX ADMIN — PREGABALIN 200 MG: 100 CAPSULE ORAL at 09:04

## 2019-04-30 RX ADMIN — Medication 10 ML: at 21:13

## 2019-04-30 RX ADMIN — Medication: at 09:41

## 2019-04-30 RX ADMIN — BUSPIRONE HYDROCHLORIDE 10 MG: 5 TABLET ORAL at 09:03

## 2019-04-30 RX ADMIN — BUSPIRONE HYDROCHLORIDE 10 MG: 5 TABLET ORAL at 21:05

## 2019-04-30 RX ADMIN — PRAMIPEXOLE DIHYDROCHLORIDE 0.25 MG: 0.25 TABLET ORAL at 21:05

## 2019-04-30 RX ADMIN — OXYCODONE HYDROCHLORIDE 5 MG: 5 TABLET ORAL at 22:21

## 2019-04-30 RX ADMIN — AMIODARONE HYDROCHLORIDE 100 MG: 200 TABLET ORAL at 09:04

## 2019-04-30 RX ADMIN — METFORMIN HYDROCHLORIDE 500 MG: 500 TABLET ORAL at 09:04

## 2019-04-30 RX ADMIN — PRAMIPEXOLE DIHYDROCHLORIDE 0.25 MG: 0.25 TABLET ORAL at 09:05

## 2019-04-30 RX ADMIN — OXYCODONE HYDROCHLORIDE 5 MG: 5 TABLET ORAL at 18:21

## 2019-04-30 RX ADMIN — Medication 10 ML: at 09:09

## 2019-04-30 RX ADMIN — OXYCODONE HYDROCHLORIDE 5 MG: 5 TABLET ORAL at 14:15

## 2019-04-30 RX ADMIN — PANTOPRAZOLE SODIUM 40 MG: 40 TABLET, DELAYED RELEASE ORAL at 09:03

## 2019-04-30 RX ADMIN — PRAVASTATIN SODIUM 80 MG: 40 TABLET ORAL at 21:05

## 2019-04-30 RX ADMIN — OXYCODONE HYDROCHLORIDE 5 MG: 5 TABLET ORAL at 05:48

## 2019-04-30 ASSESSMENT — PAIN DESCRIPTION - PAIN TYPE
TYPE: ACUTE PAIN
TYPE: CHRONIC PAIN

## 2019-04-30 ASSESSMENT — PAIN SCALES - GENERAL
PAINLEVEL_OUTOF10: 6
PAINLEVEL_OUTOF10: 0
PAINLEVEL_OUTOF10: 8
PAINLEVEL_OUTOF10: 8
PAINLEVEL_OUTOF10: 3
PAINLEVEL_OUTOF10: 8
PAINLEVEL_OUTOF10: 0
PAINLEVEL_OUTOF10: 8

## 2019-04-30 ASSESSMENT — PAIN DESCRIPTION - LOCATION
LOCATION: BACK
LOCATION: BACK
LOCATION: HEAD
LOCATION: BACK
LOCATION: BACK

## 2019-04-30 NOTE — PROGRESS NOTES
Resting in bed with eyes closed easily aroused, assessment completed. Right groin unremarkable. Denies needs will monitor.

## 2019-04-30 NOTE — PROGRESS NOTES
Pt continues to complain of feeling dizzy. Sitting up in bed visiting with wife  And reading books. NP aware. Will monitor.

## 2019-04-30 NOTE — PROGRESS NOTES
Pt complains of dizziness, orthostatic done. MD aware. No new orders at this time. Pt also complains and wants Pain meds increased.  Pt meets the max daily dosage and dose will remain the same per MD.

## 2019-04-30 NOTE — PROGRESS NOTES
VSS, no acute events overnight. Telemetry stable. Groin stable.  Pt c/o feeling a little dizzy, he wants to stay and also get an increased dose of oxycodone.      -Ortho stats    -Ambulate    -Will not increase pain medication dosage    -Hold warfarin today

## 2019-04-30 NOTE — PLAN OF CARE
Problem: Falls - Risk of:  Goal: Will remain free from falls  Description  Will remain free from falls  Note:   Fall precautions in place. Pt calls out appropriately and has been free from falls at this time. Will monitor.

## 2019-05-01 VITALS
OXYGEN SATURATION: 100 % | HEIGHT: 72 IN | DIASTOLIC BLOOD PRESSURE: 77 MMHG | HEART RATE: 63 BPM | BODY MASS INDEX: 29.38 KG/M2 | RESPIRATION RATE: 16 BRPM | TEMPERATURE: 97 F | SYSTOLIC BLOOD PRESSURE: 162 MMHG | WEIGHT: 216.93 LBS

## 2019-05-01 LAB
GLUCOSE BLD-MCNC: 161 MG/DL (ref 70–99)
INR BLD: 2.34 (ref 0.86–1.14)
LEFT VENTRICULAR EJECTION FRACTION HIGH VALUE: 60 %
LEFT VENTRICULAR EJECTION FRACTION MODE: NORMAL
PERFORMED ON: ABNORMAL
PROTHROMBIN TIME: 26.7 SEC (ref 9.8–13)

## 2019-05-01 PROCEDURE — 2580000003 HC RX 258: Performed by: INTERNAL MEDICINE

## 2019-05-01 PROCEDURE — 6370000000 HC RX 637 (ALT 250 FOR IP): Performed by: INTERNAL MEDICINE

## 2019-05-01 PROCEDURE — 85610 PROTHROMBIN TIME: CPT

## 2019-05-01 PROCEDURE — 93308 TTE F-UP OR LMTD: CPT

## 2019-05-01 PROCEDURE — 99239 HOSP IP/OBS DSCHRG MGMT >30: CPT | Performed by: INTERNAL MEDICINE

## 2019-05-01 PROCEDURE — 6370000000 HC RX 637 (ALT 250 FOR IP): Performed by: NURSE PRACTITIONER

## 2019-05-01 RX ADMIN — Medication 10 ML: at 09:12

## 2019-05-01 RX ADMIN — AMIODARONE HYDROCHLORIDE 100 MG: 200 TABLET ORAL at 09:08

## 2019-05-01 RX ADMIN — BUSPIRONE HYDROCHLORIDE 10 MG: 5 TABLET ORAL at 09:11

## 2019-05-01 RX ADMIN — OXYCODONE HYDROCHLORIDE 5 MG: 5 TABLET ORAL at 02:24

## 2019-05-01 RX ADMIN — PRAMIPEXOLE DIHYDROCHLORIDE 0.25 MG: 0.25 TABLET ORAL at 09:11

## 2019-05-01 RX ADMIN — LEVOTHYROXINE SODIUM 137 MCG: 25 TABLET ORAL at 06:35

## 2019-05-01 RX ADMIN — PANTOPRAZOLE SODIUM 40 MG: 40 TABLET, DELAYED RELEASE ORAL at 09:10

## 2019-05-01 RX ADMIN — METOPROLOL SUCCINATE 25 MG: 50 TABLET, FILM COATED, EXTENDED RELEASE ORAL at 09:09

## 2019-05-01 RX ADMIN — OXYCODONE HYDROCHLORIDE 5 MG: 5 TABLET ORAL at 08:27

## 2019-05-01 RX ADMIN — PREGABALIN 200 MG: 100 CAPSULE ORAL at 09:09

## 2019-05-01 RX ADMIN — DULOXETINE HYDROCHLORIDE 60 MG: 60 CAPSULE, DELAYED RELEASE ORAL at 09:08

## 2019-05-01 RX ADMIN — METFORMIN HYDROCHLORIDE 500 MG: 500 TABLET ORAL at 09:10

## 2019-05-01 RX ADMIN — ASPIRIN 81 MG: 81 TABLET, COATED ORAL at 09:08

## 2019-05-01 ASSESSMENT — PAIN DESCRIPTION - ORIENTATION: ORIENTATION: LOWER

## 2019-05-01 ASSESSMENT — PAIN SCALES - GENERAL
PAINLEVEL_OUTOF10: 5
PAINLEVEL_OUTOF10: 9

## 2019-05-01 ASSESSMENT — PAIN DESCRIPTION - FREQUENCY: FREQUENCY: CONTINUOUS

## 2019-05-01 ASSESSMENT — PAIN DESCRIPTION - PAIN TYPE: TYPE: CHRONIC PAIN

## 2019-05-01 ASSESSMENT — PAIN DESCRIPTION - LOCATION: LOCATION: BACK

## 2019-05-01 ASSESSMENT — PAIN DESCRIPTION - DESCRIPTORS: DESCRIPTORS: ACHING

## 2019-05-01 ASSESSMENT — PAIN DESCRIPTION - PROGRESSION: CLINICAL_PROGRESSION: GRADUALLY WORSENING

## 2019-05-01 NOTE — DISCHARGE INSTR - DIET

## 2019-05-01 NOTE — PLAN OF CARE
Problem: Falls - Risk of:  Goal: Will remain free from falls  Description  Will remain free from falls  5/1/2019 0145 by Frank Barrett RN  Outcome: Ongoing  Note:   Pt remains free of falls. Fall risk protocol in place. Bed locked in lowest position. Call light in reach. Pt instructed to call for assistance, verbalizes understanding. Will continue to monitor. 4/30/2019 1917 by Rekha Jeffery RN  Note:   Fall precautions in place. Pt calls out appropriately and has been free from falls at this time. Will monitor. Problem: Pain:  Goal: Pain level will decrease  Description  Pain level will decrease  Outcome: Ongoing  Note:   Pt still reports high pain levels. Pain medication given when it is due per pt request. No nonverbal signs of pain. Will continue to monitor.

## 2019-05-01 NOTE — PROGRESS NOTES
Pt currently getting ECHO @ bedside . Asking for pain med , ( has chronic back pain ) . Explained can administer after procedure completed . Urinal emptied of 500 cc.pt calm & appears to be comfortable.

## 2019-05-01 NOTE — DISCHARGE INSTR - ACTIVITY
SEE DISCHARGE INSTRUCTIONS;    TAKE IT EASY FOR NEXT 1 WEEK; NO HEAVY LIFTING; NO STRAINING WITH BOWEL MOVEMENTS

## 2019-05-01 NOTE — PROGRESS NOTES
[facility]   Electrophysiology Progress Note     Admit Date: 2019     Reason for follow up: Atrial fibrillation, dizziness    HPI and Interval History:   Patient seen and examined. Clinical notes reviewed. Telemetry reviewed. No new complaint today. No major events overnight. Denies having chest pain, shortness of breath, dyspnea on exertion, Orthopnea, PND at the time of this visit. Review of System:  All other systems reviewed and are negative except for that noted above. Pertinent negatives are:     · General: negative for fever, chills   · Ophthalmic ROS: negative for - eye pain or loss of vision  · ENT ROS: negative for - headaches, sore throat   · Respiratory: negative for - cough, sputum  · Cardiovascular: Reviewed in HPI  · Gastrointestinal: negative for - abdominal pain, diarrhea, N/V  · Hematology: negative for - bleeding, blood clots, bruising or jaundice  · Genito-Urinary:  negative for - Dysuria or incontinence  · Musculoskeletal: negative for - Joint swelling, muscle pain  · Neurological: negative for - confusion, dizziness, headaches   · Psychiatric: No anxiety, no depression. · Dermatological: negative for - rash      Physical Examination:  Vitals:    19 0825   BP: (!) 162/77   Pulse: 63   Resp:    Temp:    SpO2: 100%      In: 180 [P.O.:180]  Out: 2100    Wt Readings from Last 3 Encounters:   19 216 lb 14.9 oz (98.4 kg)   18 201 lb 1 oz (91.2 kg)     Temp  Av.3 °F (36.3 °C)  Min: 97 °F (36.1 °C)  Max: 97.5 °F (36.4 °C)  Pulse  Av.5  Min: 63  Max: 83  BP  Min: 93/73  Max: 184/81  SpO2  Av.8 %  Min: 94 %  Max: 100 %    Intake/Output Summary (Last 24 hours) at 2019 1059  Last data filed at 2019 1166  Gross per 24 hour   Intake 180 ml   Output 2100 ml   Net -1920 ml       · Telemetry: Sinus rhythm   · Constitutional: Oriented. No distress. · Head: Normocephalic and atraumatic.    · Mouth/Throat: Oropharynx is clear and moist.   · Eyes: Conjunctivae normal. EOM are normal.   · Neck: Neck supple. No rigidity. No JVD present. · Cardiovascular: Normal rate, regular rhythm, S1&S2. · Pulmonary/Chest: Bilateral respiratory sounds. No wheezes, No rhonchi. · Abdominal: Soft. Bowel sounds present. No distension, No tenderness. · Musculoskeletal: No tenderness. No edema    · Lymphadenopathy: Has no cervical adenopathy. · Neurological: Alert and oriented. Cranial nerve appears intact, No Gross deficit   · Skin: Skin is warm and dry. No rash noted. · Psychiatric: Has a normal behavior     Labs, diagnostic and imaging results reviewed. Reviewed. Recent Labs     04/29/19  0710      K 4.0      CO2 28   BUN 7   CREATININE 1.2     Recent Labs     04/29/19  0710   WBC 11.7*   HGB 15.7   HCT 47.0   MCV 94.7        Lab Results   Component Value Date    TROPONINI <0.01 07/13/2018     Estimated Creatinine Clearance: 69 mL/min (based on SCr of 1.2 mg/dL).    No results found for: BNP  Lab Results   Component Value Date    PROTIME 38.3 04/30/2019    PROTIME 35.7 04/29/2019    PROTIME 29.2 07/24/2018    INR 3.36 04/30/2019    INR 3.13 04/29/2019    INR 2.56 07/24/2018     Lab Results   Component Value Date    CHOL 157 07/14/2018    HDL 42 07/14/2018    TRIG 258 07/14/2018       Scheduled Meds:   warfarin (COUMADIN) daily dosing (placeholder)   Other RX Placeholder    pramipexole  0.25 mg Oral TID    DULoxetine  60 mg Oral Daily    aspirin  81 mg Oral Daily    pantoprazole  40 mg Oral Daily    metFORMIN  500 mg Oral BID WC    busPIRone  10 mg Oral BID    pravastatin  80 mg Oral Nightly    levothyroxine  137 mcg Oral QAM AC    melatonin  6 mg Oral Nightly    amiodarone  100 mg Oral Daily    pregabalin  200 mg Oral BID    metoprolol succinate  25 mg Oral Daily    sodium chloride flush  10 mL Intravenous 2 times per day    nicotine  1 patch Transdermal Daily    oxyCODONE  20 mg Oral Daily     Continuous Infusions:  PRN Meds:docusate sodium, sodium chloride flush, acetaminophen, labetalol, hydrALAZINE, polyvinyl alcohol, oxyCODONE     Patient Active Problem List    Diagnosis Date Noted    PAF (paroxysmal atrial fibrillation) ([facility] 04/29/2019    Pacemaker     Chronic pain syndrome 07/19/2018    Bradycardia     Closed nondisplaced avulsion fracture of left talus     Syncope 07/13/2018    Closed fracture of left ankle 07/13/2018    Dizziness 07/13/2018    Hematochezia 07/13/2018    Weight loss, unintentional 07/13/2018    Poor appetite 07/13/2018    Abnormal CT of brain 07/13/2018    Atrial fibrillation, rapid [facility])     Essential hypertension       Active Hospital Problems    Diagnosis Date Noted    PAF (paroxysmal atrial fibrillation) ([facility] [I48.0] 04/29/2019    Pacemaker [Z95.0]        Assessment:       Plan:     - paroxysmal atrial fibrillation  S/p  Ablation. doing well           - atrial flutter              As above       - dizziness  Resolved  Echo is OK      - HTN              Continue current treatment.            - Permanent pacemaker             The CIED was interrogated and programmed and I supervised and reviewed all the data. All findings and changes are in device interrogation sheat and reflect my personal interpretation and changes and is scanned to Epic.          I independently reviewed echo *    NOTE: This report was transcribed using voice recognition software. Every effort was made to ensure accuracy, however, inadvertent computerized transcription errors may be present.

## 2019-05-01 NOTE — DISCHARGE INSTR - COC
Continuity of Care Form    Patient Name: Roseann Rosales   :  1945  MRN:  [id number]    Admit date:  2019  Discharge date:  ***    Code Status Order: Full Code   Advance Directives:   Advance Care Flowsheet Documentation     Date/Time Healthcare Directive Type of Healthcare Directive Copy in [address] Agent's Name Healthcare Agent's Phone Number    19 1428  Yes, patient has an advance directive for healthcare treatment  --  No, copy requested from family  --  --  --          Admitting Physician:  Bhavya Chu MD  PCP: Agnes Wright MD    Discharging Nurse: [facility] Unit/Room#: CVU-2902/2902-01  Discharging Unit Phone Number: ***    Emergency Contact:   Extended Emergency Contact Information  Primary Emergency Contact: Lexi oBss  Address: [address] Phone: [phone]  Mobile Phone: [phone]  Relation: Spouse    Past Surgical History:  Past Surgical History:   Procedure Laterality Date    COLONOSCOPY      ENDOSCOPY, COLON, DIAGNOSTIC      EYE SURGERY      Left eye cornia and cataracts    FRACTURE SURGERY      Left hand    PACEMAKER PLACEMENT      SKIN BIOPSY      Right forearm       Immunization History: There is no immunization history on file for this patient.     Active Problems:  Patient Active Problem List   Diagnosis Code    Syncope R55    Closed fracture of left ankle S82.892A    Dizziness R42    Hematochezia K92.1    Weight loss, unintentional R63.4    Poor appetite R63.0    Abnormal CT of brain R90.89    Atrial fibrillation, rapid (HCC) I48.91    Benign essential HTN I10    Closed nondisplaced avulsion fracture of left talus S92.155A    Bradycardia R00.1    Chronic pain syndrome G89.4    PAF (paroxysmal atrial fibrillation) (HCC) I48.0    Pacemaker Z95.0    Atypical atrial flutter (HCC) I48.4       Isolation/Infection: Isolation          No Isolation            Nurse Assessment:  Last Vital Signs: BP (!) 162/77   Pulse 63   Temp 97 °F (36.1 °C) (Temporal)   Resp 16   Ht 6' (1.829 m)   Wt 216 lb 14.9 oz (98.4 kg)   SpO2 100%   BMI 29.42 kg/m²     Last documented pain score (0-10 scale): Pain Level: 9  Last Weight:   Wt Readings from Last 1 Encounters:   19 216 lb 14.9 oz (98.4 kg)     Mental Status:  {IP PT MENTAL STATUS:}    IV Access:  { LEYDI IV ACCESS:251717049}    Nursing Mobility/ADLs:  Walking   {CHP DME VSBX:459131902}  Transfer  {CHP DME MMOA:093809745}  Bathing  {CHP DME BPYZ:926281496}  Dressing  {CHP DME KIE}  Toileting  {CHP DME EKMN:423019585}  Feeding  {CHP DME IVHC:285493362}  Med Admin  {CHP DME PMPN:423193377}  Med Delivery   { LEYDI MED Delivery:598898188}    Wound Care Documentation and Therapy:        Elimination:  Continence:   · Bowel: {YES / UT:23573}  · Bladder: {YES / CU:68368}  Urinary Catheter: {Urinary Catheter:182670191}   Colostomy/Ileostomy/Ileal Conduit: {YES / GQ:34277}       Date of Last BM: ***    Intake/Output Summary (Last 24 hours) at 2019 1208  Last data filed at 2019 0812  Gross per 24 hour   Intake 180 ml   Output 2100 ml   Net -1920 ml     I/O last 3 completed shifts:   In: 5 [P.O.:420]  Out: 1600 [Urine:1600]    Safety Concerns:     [employer] Safety Concerns:927493118}    Impairments/Disabilities:      [employer] Impairments/Disabilities:564224200}    Nutrition Therapy:  Current Nutrition Therapy:   [employer] Diet List:662728270}    Routes of Feeding: {CHP DME Other Feedings:065853177}  Liquids: {Slp liquid thickness:78218}  Daily Fluid Restriction: {CHP DME Yes amt example:555281888}  Last Modified Barium Swallow with Video (Video Swallowing Test): {Done Not Done [facility]}    Treatments at the Time of Hospital Discharge:   Respiratory Treatments: ***  Oxygen Therapy:  {Therapy; copd oxygen:33722}  Ventilator:    { CC Vent TOIL:970345326}    Rehab

## 2019-05-01 NOTE — FLOWSHEET NOTE
Discharge instructions reviewed with patient and family member. Patient and family verbalized understanding. All home medications have been reviewed, questions answered and patient voiced understanding. All medication side effects reviewed and patient and family verbalized understanding. Patient given discharge instructions and appointment times. Patient discharged to home with family via private car. Taken to lobby via wheelchair. Follow up appointment(s) reviewed with patient and all attempts made to schedule within 7 days of discharge. [facility]  Depression Screen    1. During the past month, have you often been bothered by feeling down, depressed, or hopeless?   no    2. During the past month, have you often been bothered by little interest or pleasure in       doing things?    no

## 2019-06-11 ENCOUNTER — NURSE ONLY (OUTPATIENT)
Dept: CARDIOLOGY CLINIC | Age: 74
End: 2019-06-11
Payer: OTHER GOVERNMENT

## 2019-06-11 ENCOUNTER — OFFICE VISIT (OUTPATIENT)
Dept: CARDIOLOGY CLINIC | Age: 74
End: 2019-06-11
Payer: OTHER GOVERNMENT

## 2019-06-11 VITALS — SYSTOLIC BLOOD PRESSURE: 140 MMHG | HEART RATE: 70 BPM | DIASTOLIC BLOOD PRESSURE: 80 MMHG

## 2019-06-11 DIAGNOSIS — Z95.0 PACEMAKER: Primary | ICD-10-CM

## 2019-06-11 DIAGNOSIS — R00.1 BRADYCARDIA: ICD-10-CM

## 2019-06-11 DIAGNOSIS — I48.0 PAF (PAROXYSMAL ATRIAL FIBRILLATION) (HCC): ICD-10-CM

## 2019-06-11 PROCEDURE — 93280 PM DEVICE PROGR EVAL DUAL: CPT | Performed by: INTERNAL MEDICINE

## 2019-06-11 PROCEDURE — 99214 OFFICE O/P EST MOD 30 MIN: CPT | Performed by: NURSE PRACTITIONER

## 2019-06-11 RX ORDER — OXYCODONE AND ACETAMINOPHEN 10; 325 MG/1; MG/1
1 TABLET ORAL
COMMUNITY
End: 2020-02-02 | Stop reason: ALTCHOICE

## 2019-06-11 NOTE — PROGRESS NOTES
Patient comes in for programming evaluation for his pacemaker. All sensing and pacing parameters are within normal range. Recent RFCA for AF. No recent AF seen. Pt had short recording of AT. No changes need to be made at this time. Please see interrogation for more detail. Patient will follow up in 3 months in office or remotely.

## 2019-06-11 NOTE — PROGRESS NOTES
[address]     Outpatient Follow Up Note    CHIEF COMPLAINT / HPI: Hospital Follow Up secondary to ablation for at fib. Hospital Course progressed as follows per discharge summary: He had ablation for at fib 5/19 with Dr. Ashwin Tyler, and has PPM implanted in 2015 at the [facility] by Dr. Ashwin Tyler  He has hx of HTN, hyperlipidemia and follows at the [facility] for his medical care He does not have any chest pain, palpitations, edema and SOB  He moved and can not find his device to check pacer remotely, he had been doing this at the [facility], now that he is coming here will set up with our office          Past Medical History:   Diagnosis Date    Arthritis     Diabetes mellitus ([address]     History of blood transfusion     Hyperlipidemia     Hypertension     Psychiatric problem     PTSD    Thyroid disease      Social History:    Social History     Tobacco Use   Smoking Status Former Smoker   Smokeless Tobacco Never Used     Current Medications:  Current Outpatient Medications   Medication Sig Dispense Refill    oxyCODONE-acetaminophen (PERCOCET)  MG per tablet Take 1 tablet by mouth every 4-6 hours as needed for Pain.  metoprolol succinate (TOPROL XL) 25 MG extended release tablet Take 1 tablet by mouth daily (Patient taking differently: Take 37.5 mg by mouth daily ) 30 tablet 3    docusate sodium (COLACE, DULCOLAX) 100 MG CAPS Take 100 mg by mouth 2 times daily as needed for Constipation 30 capsule 0    pramipexole (MIRAPEX) 0.25 MG tablet Take 0.25 mg by mouth 3 times daily Take 1 in the morning, 1 at 5pm, and 9 at bedtime  May take 1 tablet prn for breakthrough symptoms of restless leg syndrome      testosterone cypionate (DEPOTESTOTERONE CYPIONATE) 200 MG/ML injection Inject 150 mg into the muscle See Admin Instructions. Aryan Jasmine DULoxetine (CYMBALTA) 60 MG extended release capsule Take 60 mg by mouth daily      aspirin 81 MG EC tablet Take 81 mg by mouth daily      pantoprazole (PROTONIX) 20 MG tablet Take 20 mg by mouth daily      metFORMIN (GLUCOPHAGE) 500 MG tablet Take 500 mg by mouth 2 times daily (with meals)      busPIRone (BUSPAR) 10 MG tablet Take 10 mg by mouth 2 times daily      pravastatin (PRAVACHOL) 80 MG tablet Take 80 mg by mouth daily      levothyroxine (SYNTHROID) 137 MCG tablet Take 137 mcg by mouth Daily      melatonin 3 MG TABS tablet Take 6 mg by mouth nightly May take 1 additional tablet in 1 hour if no effect      polyethyl glycol-propyl glycol 0.4-0.3 % (SYSTANE) 0.4-0.3 % ophthalmic solution Place 1-2 drops into both eyes 4 times daily as needed for Dry Eyes      warfarin (COUMADIN) 2 MG tablet Take 2 mg by mouth daily -- 4 mg every Tuesday, and 2 mg all other days of the week      pregabalin (LYRICA) 100 MG capsule Take 2 capsules by mouth 2 times daily for 30 days. . 6 capsule 0     No current facility-administered medications for this visit. REVIEW OF SYSTEMS:   CONSTITUTIONAL: No major weight gain or loss, fatigue, weakness, night sweats or fever. There's been no change in energy level, sleep pattern, or activity level. HEENT: No new vision difficulties or ringing in the ears. RESPIRATORY: No new SOB, PND, orthopnea or cough. CARDIOVASCULAR: See HPI  GI: No nausea, vomiting, diarrhea, constipation, abdominal pain or changes in bowel habits. : No urinary frequency, urgency, incontinence hematuria or dysuria. SKIN: No cyanosis or skin lesions. MUSCULOSKELETAL: No new muscle or joint pain. NEUROLOGICAL: No syncope or TIA-like symptoms. PSYCHIATRIC: No anxiety, pain, insomnia or depression    Objective:   PHYSICAL EXAM:        VITALS:  BP (!) 140/80   Pulse 70     CONSTITUTIONAL: Cooperative, no apparent distress, and appears well nourished / developed  NEUROLOGIC:  Awake and orientated to person, place and time. PSYCH: Calm affect. SKIN: Warm and dry.   HEENT: Sclera non-icteric, normocephalic, neck supple, no elevation of JVP, normal carotid pulses with no bruits and thyroid normal size. LUNGS:  No increased work of breathing and clear to auscultation, no crackles or wheezing. CARDIOVASCULAR:  Regular rate and rhythm with no murmurs, gallops, rubs, or abnormal heart sounds, normal PMI. The apical impulses not displaced. PPM L upper chest.         Heart tones are crisp and normal      Cervical veins are not engorged                 JVP less than 8 cm H2O                                                 The carotid upstroke is normal in amplitude and contour without delay or bruit    ABDOMEN:  Normal bowel sounds, non-distended and non-tender to palpation   EXT: No edema, no calf tenderness. Pulses are present bilaterally. DATA:    Lab Results   Component Value Date    ALT 14 04/29/2019    AST 21 04/29/2019    ALKPHOS 71 04/29/2019    BILITOT 0.4 04/29/2019     Lab Results   Component Value Date    CREATININE 1.2 04/29/2019    BUN 7 04/29/2019     04/29/2019    K 4.0 04/29/2019     04/29/2019    CO2 28 04/29/2019     No results found for: TSH, M2OLBDU, X2NMVON, THYROIDAB  Lab Results   Component Value Date    WBC 11.7 (H) 04/29/2019    HGB 15.7 04/29/2019    HCT 47.0 04/29/2019    MCV 94.7 04/29/2019     04/29/2019     No components found for: CHLPL  Lab Results   Component Value Date    TRIG 258 (H) 07/14/2018     Lab Results   Component Value Date    HDL 42 07/14/2018     Lab Results   Component Value Date    LDLCALC 63 07/14/2018     Lab Results   Component Value Date    LABVLDL 52 07/14/2018     Radiology Review:  Pertinent images / reports were reviewed as a part of this visit and reveals the following:   Echo: 2018 at [place], EF normal  2019   Summary   -Limited echo for dizziness s/p a-fib ablation.   -Left ventricular cavity size is normal. There is mild concentric left   ventricular hypertrophy. Overall left ventricular systolic function appears   normal with a estimated ejection fraction of 60%.    -Mild mitral, tricuspid and pulmonic

## 2019-08-21 NOTE — PROGRESS NOTES
- bleeding, blood clots, bruising or jaundice  Genito-Urinary:  negative for - Dysuria or incontinence  Musculoskeletal: negative for - Joint swelling, muscle pain  Neurological: negative for - confusion, dizziness, headaches   Psychiatric: No anxiety, no depression. Dermatological: negative for - rash    Physical Examination:  Vitals:    19 1019   BP: 117/80   Pulse: 77      Wt Readings from Last 3 Encounters:   19 222 lb (100.7 kg)   19 216 lb 14.9 oz (98.4 kg)   18 201 lb 1 oz (91.2 kg)       · Constitutional: Oriented. No distress. · Head: Normocephalic and atraumatic. · Mouth/Throat: Oropharynx is clear and moist.   · Eyes: Conjunctivae normal. EOM are normal.   · Neck: Neck supple. No rigidity. No JVD present. · Cardiovascular: Normal rate, regular rhythm, S1&S2. · Pulmonary/Chest: Bilateral respiratory sounds. No wheezes, No rhonchi. · Abdominal: Soft. Bowel sounds present. No distension, No tenderness. · Musculoskeletal: No tenderness. No edema    · Lymphadenopathy: Has no cervical adenopathy. · Neurological: Alert and oriented. Cranial nerve appears intact, No Gross deficit   · Skin: Skin is warm and dry. No rash noted. · Psychiatric: Has a normal behavior       Labs, diagnostic and imaging results reviewed. Lab Results   Component Value Date    TSHREFLEX 1.69 07/15/2018    CREATININE 1.2 2019    CREATININE 1.0 2018    AST 21 2019    ALT 14 2019     Reviewed. EC19  Sinus Rhythm  QTcH 438     Echo: 19  Summary   -Limited echo for dizziness s/p a-fib ablation.   -Left ventricular cavity size is normal. There is mild concentric left   ventricular hypertrophy. Overall left ventricular systolic function appears   normal with a estimated ejection fraction of 60%.    -Mild mitral, tricuspid and pulmonic regurgitation.   -The aortic root is at the upper limits of normal.   -Pacer / ICD wire is visualized in the right heart.   -No pericardial effusion noted. Cath:     Medication:  Current Outpatient Medications   Medication Sig Dispense Refill    Cod Liver Oil OIL Take by mouth daily      potassium chloride (KLOR-CON) 20 MEQ packet Take 20 mEq by mouth daily      b complex vitamins capsule Take 1 capsule by mouth daily      oxyCODONE-acetaminophen (PERCOCET)  MG per tablet Take 1 tablet by mouth every 4-6 hours as needed for Pain.  metoprolol succinate (TOPROL XL) 25 MG extended release tablet Take 1 tablet by mouth daily (Patient taking differently: Take 37.5 mg by mouth daily ) 30 tablet 3    docusate sodium (COLACE, DULCOLAX) 100 MG CAPS Take 100 mg by mouth 2 times daily as needed for Constipation 30 capsule 0    pramipexole (MIRAPEX) 0.25 MG tablet Take 0.25 mg by mouth 3 times daily Take 1 in the morning, 1 at 5pm, and 9 at bedtime  May take 1 tablet prn for breakthrough symptoms of restless leg syndrome      testosterone cypionate (DEPOTESTOTERONE CYPIONATE) 200 MG/ML injection Inject 150 mg into the muscle See Admin Instructions. Ayaan Meraz DULoxetine (CYMBALTA) 60 MG extended release capsule Take 60 mg by mouth daily      aspirin 81 MG EC tablet Take 81 mg by mouth daily      pantoprazole (PROTONIX) 20 MG tablet Take 20 mg by mouth daily      metFORMIN (GLUCOPHAGE) 500 MG tablet Take 500 mg by mouth 2 times daily (with meals)      busPIRone (BUSPAR) 10 MG tablet Take 10 mg by mouth 2 times daily      pravastatin (PRAVACHOL) 80 MG tablet Take 80 mg by mouth daily      levothyroxine (SYNTHROID) 137 MCG tablet Take 137 mcg by mouth Daily      melatonin 3 MG TABS tablet Take 6 mg by mouth nightly May take 1 additional tablet in 1 hour if no effect      polyethyl glycol-propyl glycol 0.4-0.3 % (SYSTANE) 0.4-0.3 % ophthalmic solution Place 1-2 drops into both eyes 4 times daily as needed for Dry Eyes      warfarin (COUMADIN) 2 MG tablet Take 2 mg by mouth daily -- 4 mg every Tuesday, and 2 mg all other days of the

## 2019-08-22 ENCOUNTER — OFFICE VISIT (OUTPATIENT)
Dept: CARDIOLOGY CLINIC | Age: 74
End: 2019-08-22
Payer: OTHER GOVERNMENT

## 2019-08-22 ENCOUNTER — NURSE ONLY (OUTPATIENT)
Dept: CARDIOLOGY CLINIC | Age: 74
End: 2019-08-22
Payer: OTHER GOVERNMENT

## 2019-08-22 VITALS
HEART RATE: 77 BPM | HEIGHT: 73 IN | SYSTOLIC BLOOD PRESSURE: 117 MMHG | BODY MASS INDEX: 29.42 KG/M2 | DIASTOLIC BLOOD PRESSURE: 80 MMHG | WEIGHT: 222 LBS

## 2019-08-22 DIAGNOSIS — I48.0 PAF (PAROXYSMAL ATRIAL FIBRILLATION) (HCC): Primary | ICD-10-CM

## 2019-08-22 DIAGNOSIS — I48.0 PAF (PAROXYSMAL ATRIAL FIBRILLATION) (HCC): ICD-10-CM

## 2019-08-22 DIAGNOSIS — R55 SYNCOPE, UNSPECIFIED SYNCOPE TYPE: ICD-10-CM

## 2019-08-22 DIAGNOSIS — I48.91 ATRIAL FIBRILLATION, RAPID (HCC): ICD-10-CM

## 2019-08-22 DIAGNOSIS — I10 BENIGN ESSENTIAL HTN: ICD-10-CM

## 2019-08-22 DIAGNOSIS — Z95.0 PACEMAKER: ICD-10-CM

## 2019-08-22 DIAGNOSIS — R00.1 BRADYCARDIA: ICD-10-CM

## 2019-08-22 DIAGNOSIS — I48.4 ATYPICAL ATRIAL FLUTTER (HCC): ICD-10-CM

## 2019-08-22 PROCEDURE — 99214 OFFICE O/P EST MOD 30 MIN: CPT | Performed by: INTERNAL MEDICINE

## 2019-08-22 PROCEDURE — 93280 PM DEVICE PROGR EVAL DUAL: CPT | Performed by: INTERNAL MEDICINE

## 2019-08-22 RX ORDER — POTASSIUM CHLORIDE 1.5 G/1.77G
20 POWDER, FOR SOLUTION ORAL DAILY
COMMUNITY
End: 2020-02-02 | Stop reason: ALTCHOICE

## 2019-08-22 RX ORDER — VITAMIN B COMPLEX
1 CAPSULE ORAL DAILY
Status: ON HOLD | COMMUNITY
End: 2020-02-02

## 2019-08-23 ENCOUNTER — TELEPHONE (OUTPATIENT)
Dept: CARDIOLOGY CLINIC | Age: 74
End: 2019-08-23

## 2020-02-02 ENCOUNTER — HOSPITAL ENCOUNTER (INPATIENT)
Age: 75
LOS: 2 days | Discharge: HOME | DRG: 310 | End: 2020-02-04
Attending: EMERGENCY MEDICINE | Admitting: INTERNAL MEDICINE
Payer: OTHER GOVERNMENT

## 2020-02-02 ENCOUNTER — APPOINTMENT (OUTPATIENT)
Dept: GENERAL RADIOLOGY | Age: 75
DRG: 310 | End: 2020-02-02
Payer: OTHER GOVERNMENT

## 2020-02-02 PROBLEM — E83.42 HYPOMAGNESEMIA: Status: ACTIVE | Noted: 2020-02-02

## 2020-02-02 PROBLEM — I49.9 CARDIAC ARRHYTHMIA: Status: ACTIVE | Noted: 2020-02-02

## 2020-02-02 LAB
A/G RATIO: 1.4 (ref 1.1–2.2)
ALBUMIN SERPL-MCNC: 3.9 G/DL (ref 3.4–5)
ALP BLD-CCNC: 74 U/L (ref 40–129)
ALT SERPL-CCNC: 19 U/L (ref 10–40)
ANION GAP SERPL CALCULATED.3IONS-SCNC: 11 MMOL/L (ref 3–16)
ANION GAP SERPL CALCULATED.3IONS-SCNC: 11 MMOL/L (ref 3–16)
APTT: 33 SEC (ref 24.2–36.2)
APTT: 58.8 SEC (ref 24.2–36.2)
AST SERPL-CCNC: 20 U/L (ref 15–37)
BASOPHILS ABSOLUTE: 0.1 K/UL (ref 0–0.2)
BASOPHILS RELATIVE PERCENT: 0.7 %
BILIRUB SERPL-MCNC: <0.2 MG/DL (ref 0–1)
BILIRUBIN URINE: NEGATIVE
BLOOD, URINE: NEGATIVE
BUN BLDV-MCNC: 13 MG/DL (ref 7–20)
BUN BLDV-MCNC: 13 MG/DL (ref 7–20)
CALCIUM SERPL-MCNC: 8.6 MG/DL (ref 8.3–10.6)
CALCIUM SERPL-MCNC: 9.2 MG/DL (ref 8.3–10.6)
CHLORIDE BLD-SCNC: 100 MMOL/L (ref 99–110)
CHLORIDE BLD-SCNC: 101 MMOL/L (ref 99–110)
CLARITY: CLEAR
CO2: 25 MMOL/L (ref 21–32)
CO2: 25 MMOL/L (ref 21–32)
COLOR: YELLOW
CREAT SERPL-MCNC: 0.9 MG/DL (ref 0.8–1.3)
CREAT SERPL-MCNC: 1.2 MG/DL (ref 0.8–1.3)
EKG ATRIAL RATE: 202 BPM
EKG ATRIAL RATE: 60 BPM
EKG ATRIAL RATE: 81 BPM
EKG DIAGNOSIS: NORMAL
EKG P AXIS: 51 DEGREES
EKG P AXIS: 56 DEGREES
EKG P-R INTERVAL: 152 MS
EKG P-R INTERVAL: 152 MS
EKG Q-T INTERVAL: 266 MS
EKG Q-T INTERVAL: 374 MS
EKG Q-T INTERVAL: 456 MS
EKG QRS DURATION: 104 MS
EKG QRS DURATION: 90 MS
EKG QRS DURATION: 92 MS
EKG QTC CALCULATION (BAZETT): 434 MS
EKG QTC CALCULATION (BAZETT): 456 MS
EKG QTC CALCULATION (BAZETT): 471 MS
EKG R AXIS: -17 DEGREES
EKG R AXIS: -57 DEGREES
EKG R AXIS: -61 DEGREES
EKG T AXIS: 100 DEGREES
EKG T AXIS: 46 DEGREES
EKG T AXIS: 87 DEGREES
EKG VENTRICULAR RATE: 189 BPM
EKG VENTRICULAR RATE: 60 BPM
EKG VENTRICULAR RATE: 81 BPM
EOSINOPHILS ABSOLUTE: 0.3 K/UL (ref 0–0.6)
EOSINOPHILS RELATIVE PERCENT: 3.6 %
GFR AFRICAN AMERICAN: >60
GFR AFRICAN AMERICAN: >60
GFR NON-AFRICAN AMERICAN: 59
GFR NON-AFRICAN AMERICAN: >60
GLOBULIN: 2.7 G/DL
GLUCOSE BLD-MCNC: 131 MG/DL (ref 70–99)
GLUCOSE BLD-MCNC: 153 MG/DL (ref 70–99)
GLUCOSE BLD-MCNC: 162 MG/DL (ref 70–99)
GLUCOSE BLD-MCNC: 163 MG/DL (ref 70–99)
GLUCOSE BLD-MCNC: 178 MG/DL (ref 70–99)
GLUCOSE BLD-MCNC: 208 MG/DL (ref 70–99)
GLUCOSE BLD-MCNC: 255 MG/DL (ref 70–99)
GLUCOSE BLD-MCNC: 259 MG/DL (ref 70–99)
GLUCOSE URINE: NEGATIVE MG/DL
HCT VFR BLD CALC: 39.4 % (ref 40.5–52.5)
HCT VFR BLD CALC: 40.7 % (ref 40.5–52.5)
HEMOGLOBIN: 13.4 G/DL (ref 13.5–17.5)
HEMOGLOBIN: 14 G/DL (ref 13.5–17.5)
KETONES, URINE: NEGATIVE MG/DL
LEUKOCYTE ESTERASE, URINE: NEGATIVE
LYMPHOCYTES ABSOLUTE: 3 K/UL (ref 1–5.1)
LYMPHOCYTES RELATIVE PERCENT: 41.1 %
MAGNESIUM: 1.6 MG/DL (ref 1.8–2.4)
MAGNESIUM: 2.1 MG/DL (ref 1.8–2.4)
MCH RBC QN AUTO: 32.2 PG (ref 26–34)
MCH RBC QN AUTO: 32.3 PG (ref 26–34)
MCHC RBC AUTO-ENTMCNC: 34 G/DL (ref 31–36)
MCHC RBC AUTO-ENTMCNC: 34.5 G/DL (ref 31–36)
MCV RBC AUTO: 93.4 FL (ref 80–100)
MCV RBC AUTO: 94.8 FL (ref 80–100)
MICROSCOPIC EXAMINATION: NORMAL
MONOCYTES ABSOLUTE: 0.7 K/UL (ref 0–1.3)
MONOCYTES RELATIVE PERCENT: 10.2 %
NEUTROPHILS ABSOLUTE: 3.2 K/UL (ref 1.7–7.7)
NEUTROPHILS RELATIVE PERCENT: 44.4 %
NITRITE, URINE: NEGATIVE
PDW BLD-RTO: 12.7 % (ref 12.4–15.4)
PDW BLD-RTO: 12.8 % (ref 12.4–15.4)
PERFORMED ON: ABNORMAL
PH UA: 7 (ref 5–8)
PLATELET # BLD: 167 K/UL (ref 135–450)
PLATELET # BLD: 169 K/UL (ref 135–450)
PMV BLD AUTO: 8.8 FL (ref 5–10.5)
PMV BLD AUTO: 8.8 FL (ref 5–10.5)
POTASSIUM REFLEX MAGNESIUM: 4.1 MMOL/L (ref 3.5–5.1)
POTASSIUM REFLEX MAGNESIUM: 4.3 MMOL/L (ref 3.5–5.1)
PROTEIN UA: NEGATIVE MG/DL
RBC # BLD: 4.16 M/UL (ref 4.2–5.9)
RBC # BLD: 4.35 M/UL (ref 4.2–5.9)
SODIUM BLD-SCNC: 136 MMOL/L (ref 136–145)
SODIUM BLD-SCNC: 137 MMOL/L (ref 136–145)
SPECIFIC GRAVITY UA: <=1.005 (ref 1–1.03)
TOTAL PROTEIN: 6.6 G/DL (ref 6.4–8.2)
TROPONIN: <0.01 NG/ML
TSH REFLEX: 1.74 UIU/ML (ref 0.27–4.2)
URINE REFLEX TO CULTURE: NORMAL
URINE TYPE: NORMAL
UROBILINOGEN, URINE: 0.2 E.U./DL
WBC # BLD: 7.3 K/UL (ref 4–11)
WBC # BLD: 7.5 K/UL (ref 4–11)

## 2020-02-02 PROCEDURE — 1200000000 HC SEMI PRIVATE

## 2020-02-02 PROCEDURE — 2700000000 HC OXYGEN THERAPY PER DAY

## 2020-02-02 PROCEDURE — 84443 ASSAY THYROID STIM HORMONE: CPT

## 2020-02-02 PROCEDURE — 93010 ELECTROCARDIOGRAM REPORT: CPT | Performed by: INTERNAL MEDICINE

## 2020-02-02 PROCEDURE — 80053 COMPREHEN METABOLIC PANEL: CPT

## 2020-02-02 PROCEDURE — 93005 ELECTROCARDIOGRAM TRACING: CPT | Performed by: EMERGENCY MEDICINE

## 2020-02-02 PROCEDURE — 6360000002 HC RX W HCPCS: Performed by: INTERNAL MEDICINE

## 2020-02-02 PROCEDURE — 99285 EMERGENCY DEPT VISIT HI MDM: CPT

## 2020-02-02 PROCEDURE — 6360000002 HC RX W HCPCS: Performed by: NURSE PRACTITIONER

## 2020-02-02 PROCEDURE — 85027 COMPLETE CBC AUTOMATED: CPT

## 2020-02-02 PROCEDURE — 83735 ASSAY OF MAGNESIUM: CPT

## 2020-02-02 PROCEDURE — 84484 ASSAY OF TROPONIN QUANT: CPT

## 2020-02-02 PROCEDURE — 94761 N-INVAS EAR/PLS OXIMETRY MLT: CPT

## 2020-02-02 PROCEDURE — 6370000000 HC RX 637 (ALT 250 FOR IP): Performed by: NURSE PRACTITIONER

## 2020-02-02 PROCEDURE — 36415 COLL VENOUS BLD VENIPUNCTURE: CPT

## 2020-02-02 PROCEDURE — 6360000002 HC RX W HCPCS: Performed by: EMERGENCY MEDICINE

## 2020-02-02 PROCEDURE — 6370000000 HC RX 637 (ALT 250 FOR IP): Performed by: INTERNAL MEDICINE

## 2020-02-02 PROCEDURE — 85730 THROMBOPLASTIN TIME PARTIAL: CPT

## 2020-02-02 PROCEDURE — 2580000003 HC RX 258

## 2020-02-02 PROCEDURE — 93005 ELECTROCARDIOGRAM TRACING: CPT | Performed by: NURSE PRACTITIONER

## 2020-02-02 PROCEDURE — 99222 1ST HOSP IP/OBS MODERATE 55: CPT | Performed by: INTERNAL MEDICINE

## 2020-02-02 PROCEDURE — 80061 LIPID PANEL: CPT

## 2020-02-02 PROCEDURE — 2580000003 HC RX 258: Performed by: EMERGENCY MEDICINE

## 2020-02-02 PROCEDURE — 71045 X-RAY EXAM CHEST 1 VIEW: CPT

## 2020-02-02 PROCEDURE — 96374 THER/PROPH/DIAG INJ IV PUSH: CPT

## 2020-02-02 PROCEDURE — 85025 COMPLETE CBC W/AUTO DIFF WBC: CPT

## 2020-02-02 PROCEDURE — 2580000003 HC RX 258: Performed by: INTERNAL MEDICINE

## 2020-02-02 PROCEDURE — 81003 URINALYSIS AUTO W/O SCOPE: CPT

## 2020-02-02 RX ORDER — ONDANSETRON 2 MG/ML
4 INJECTION INTRAMUSCULAR; INTRAVENOUS EVERY 6 HOURS PRN
Status: DISCONTINUED | OUTPATIENT
Start: 2020-02-02 | End: 2020-02-04 | Stop reason: HOSPADM

## 2020-02-02 RX ORDER — METOPROLOL SUCCINATE 25 MG/1
37.5 TABLET, EXTENDED RELEASE ORAL DAILY
Status: DISCONTINUED | OUTPATIENT
Start: 2020-02-02 | End: 2020-02-02

## 2020-02-02 RX ORDER — METHYLPHENIDATE HYDROCHLORIDE 10 MG/1
20 TABLET ORAL 2 TIMES DAILY
Status: DISCONTINUED | OUTPATIENT
Start: 2020-02-02 | End: 2020-02-04 | Stop reason: HOSPADM

## 2020-02-02 RX ORDER — METHYLPHENIDATE HYDROCHLORIDE 10 MG/1
20 TABLET ORAL 2 TIMES DAILY
COMMUNITY

## 2020-02-02 RX ORDER — HEPARIN SODIUM 10000 [USP'U]/100ML
10 INJECTION, SOLUTION INTRAVENOUS CONTINUOUS
Status: DISCONTINUED | OUTPATIENT
Start: 2020-02-02 | End: 2020-02-03

## 2020-02-02 RX ORDER — AMLODIPINE BESYLATE 10 MG/1
TABLET ORAL DAILY
Status: ON HOLD | COMMUNITY
End: 2020-02-04 | Stop reason: HOSPADM

## 2020-02-02 RX ORDER — NITROGLYCERIN 0.4 MG/1
0.4 TABLET SUBLINGUAL EVERY 5 MIN PRN
Status: DISCONTINUED | OUTPATIENT
Start: 2020-02-02 | End: 2020-02-04 | Stop reason: HOSPADM

## 2020-02-02 RX ORDER — DOCUSATE SODIUM 100 MG/1
100 CAPSULE, LIQUID FILLED ORAL 2 TIMES DAILY
COMMUNITY

## 2020-02-02 RX ORDER — MORPHINE SULFATE 15 MG/1
15 TABLET ORAL EVERY 4 HOURS PRN
Status: DISCONTINUED | OUTPATIENT
Start: 2020-02-02 | End: 2020-02-04 | Stop reason: HOSPADM

## 2020-02-02 RX ORDER — TESTOSTERONE 12.5 MG/1.25G
5 GEL TOPICAL DAILY
COMMUNITY

## 2020-02-02 RX ORDER — DEXTROSE MONOHYDRATE 25 G/50ML
12.5 INJECTION, SOLUTION INTRAVENOUS PRN
Status: DISCONTINUED | OUTPATIENT
Start: 2020-02-02 | End: 2020-02-04 | Stop reason: HOSPADM

## 2020-02-02 RX ORDER — NICOTINE 21 MG/24HR
1 PATCH, TRANSDERMAL 24 HOURS TRANSDERMAL EVERY 24 HOURS
COMMUNITY

## 2020-02-02 RX ORDER — METOPROLOL TARTRATE 5 MG/5ML
INJECTION INTRAVENOUS
Status: DISCONTINUED
Start: 2020-02-02 | End: 2020-02-02

## 2020-02-02 RX ORDER — DEXTROSE MONOHYDRATE 50 MG/ML
100 INJECTION, SOLUTION INTRAVENOUS PRN
Status: DISCONTINUED | OUTPATIENT
Start: 2020-02-02 | End: 2020-02-04 | Stop reason: HOSPADM

## 2020-02-02 RX ORDER — MAGNESIUM SULFATE 1 G/100ML
1 INJECTION INTRAVENOUS ONCE
Status: DISCONTINUED | OUTPATIENT
Start: 2020-02-02 | End: 2020-02-02

## 2020-02-02 RX ORDER — 0.9 % SODIUM CHLORIDE 0.9 %
1000 INTRAVENOUS SOLUTION INTRAVENOUS ONCE
Status: COMPLETED | OUTPATIENT
Start: 2020-02-02 | End: 2020-02-02

## 2020-02-02 RX ORDER — SODIUM CHLORIDE 0.9 % (FLUSH) 0.9 %
10 SYRINGE (ML) INJECTION PRN
Status: DISCONTINUED | OUTPATIENT
Start: 2020-02-02 | End: 2020-02-04 | Stop reason: HOSPADM

## 2020-02-02 RX ORDER — SODIUM CHLORIDE 9 MG/ML
INJECTION, SOLUTION INTRAVENOUS
Status: COMPLETED
Start: 2020-02-02 | End: 2020-02-02

## 2020-02-02 RX ORDER — OXYCODONE HYDROCHLORIDE 15 MG/1
15 TABLET ORAL EVERY 6 HOURS
Status: DISCONTINUED | OUTPATIENT
Start: 2020-02-02 | End: 2020-02-02

## 2020-02-02 RX ORDER — METHOCARBAMOL 500 MG/1
500 TABLET, FILM COATED ORAL 3 TIMES DAILY PRN
COMMUNITY

## 2020-02-02 RX ORDER — CHOLECALCIFEROL (VITAMIN D3) 25 MCG
CAPSULE ORAL
COMMUNITY

## 2020-02-02 RX ORDER — OXYCODONE HYDROCHLORIDE 15 MG/1
15 TABLET ORAL EVERY 6 HOURS
Status: ON HOLD | COMMUNITY
End: 2020-02-02

## 2020-02-02 RX ORDER — ACETAMINOPHEN 325 MG/1
650 TABLET ORAL EVERY 4 HOURS PRN
Status: DISCONTINUED | OUTPATIENT
Start: 2020-02-02 | End: 2020-02-04 | Stop reason: HOSPADM

## 2020-02-02 RX ORDER — HEPARIN SODIUM 1000 [USP'U]/ML
4000 INJECTION, SOLUTION INTRAVENOUS; SUBCUTANEOUS ONCE
Status: COMPLETED | OUTPATIENT
Start: 2020-02-02 | End: 2020-02-02

## 2020-02-02 RX ORDER — LEVOTHYROXINE SODIUM 0.1 MG/1
100 TABLET ORAL DAILY
Status: DISCONTINUED | OUTPATIENT
Start: 2020-02-02 | End: 2020-02-04 | Stop reason: HOSPADM

## 2020-02-02 RX ORDER — PRAVASTATIN SODIUM 80 MG/1
80 TABLET ORAL DAILY
Status: DISCONTINUED | OUTPATIENT
Start: 2020-02-02 | End: 2020-02-04 | Stop reason: HOSPADM

## 2020-02-02 RX ORDER — ASPIRIN 81 MG/1
81 TABLET ORAL DAILY
Status: DISCONTINUED | OUTPATIENT
Start: 2020-02-02 | End: 2020-02-04 | Stop reason: HOSPADM

## 2020-02-02 RX ORDER — MORPHINE SULFATE 4 MG/ML
10 INJECTION, SOLUTION INTRAMUSCULAR; INTRAVENOUS ONCE
Status: COMPLETED | OUTPATIENT
Start: 2020-02-02 | End: 2020-02-02

## 2020-02-02 RX ORDER — METOPROLOL SUCCINATE 50 MG/1
50 TABLET, EXTENDED RELEASE ORAL DAILY
Status: DISCONTINUED | OUTPATIENT
Start: 2020-02-03 | End: 2020-02-03

## 2020-02-02 RX ORDER — HEPARIN SODIUM 1000 [USP'U]/ML
4000 INJECTION, SOLUTION INTRAVENOUS; SUBCUTANEOUS PRN
Status: DISCONTINUED | OUTPATIENT
Start: 2020-02-02 | End: 2020-02-04 | Stop reason: HOSPADM

## 2020-02-02 RX ORDER — AMLODIPINE BESYLATE 5 MG/1
10 TABLET ORAL DAILY
Status: DISCONTINUED | OUTPATIENT
Start: 2020-02-02 | End: 2020-02-02

## 2020-02-02 RX ORDER — LEVOTHYROXINE SODIUM 0.1 MG/1
100 TABLET ORAL DAILY
COMMUNITY

## 2020-02-02 RX ORDER — DOCUSATE SODIUM 100 MG/1
100 CAPSULE, LIQUID FILLED ORAL 2 TIMES DAILY
Status: DISCONTINUED | OUTPATIENT
Start: 2020-02-02 | End: 2020-02-04 | Stop reason: HOSPADM

## 2020-02-02 RX ORDER — METHOCARBAMOL 500 MG/1
500 TABLET, FILM COATED ORAL 3 TIMES DAILY PRN
Status: DISCONTINUED | OUTPATIENT
Start: 2020-02-02 | End: 2020-02-04 | Stop reason: HOSPADM

## 2020-02-02 RX ORDER — HEPARIN SODIUM 1000 [USP'U]/ML
2000 INJECTION, SOLUTION INTRAVENOUS; SUBCUTANEOUS PRN
Status: DISCONTINUED | OUTPATIENT
Start: 2020-02-02 | End: 2020-02-04 | Stop reason: HOSPADM

## 2020-02-02 RX ORDER — MAGNESIUM SULFATE IN WATER 40 MG/ML
2 INJECTION, SOLUTION INTRAVENOUS ONCE
Status: COMPLETED | OUTPATIENT
Start: 2020-02-02 | End: 2020-02-02

## 2020-02-02 RX ORDER — 0.9 % SODIUM CHLORIDE 0.9 %
500 INTRAVENOUS SOLUTION INTRAVENOUS ONCE
Status: DISCONTINUED | OUTPATIENT
Start: 2020-02-02 | End: 2020-02-02

## 2020-02-02 RX ORDER — NICOTINE 21 MG/24HR
1 PATCH, TRANSDERMAL 24 HOURS TRANSDERMAL DAILY
Status: DISCONTINUED | OUTPATIENT
Start: 2020-02-03 | End: 2020-02-04 | Stop reason: HOSPADM

## 2020-02-02 RX ORDER — SODIUM CHLORIDE 0.9 % (FLUSH) 0.9 %
10 SYRINGE (ML) INJECTION EVERY 12 HOURS SCHEDULED
Status: DISCONTINUED | OUTPATIENT
Start: 2020-02-02 | End: 2020-02-04 | Stop reason: HOSPADM

## 2020-02-02 RX ORDER — PANTOPRAZOLE SODIUM 20 MG/1
20 TABLET, DELAYED RELEASE ORAL
Status: DISCONTINUED | OUTPATIENT
Start: 2020-02-03 | End: 2020-02-04 | Stop reason: HOSPADM

## 2020-02-02 RX ORDER — MORPHINE SULFATE 2 MG/ML
2 INJECTION, SOLUTION INTRAMUSCULAR; INTRAVENOUS ONCE
Status: COMPLETED | OUTPATIENT
Start: 2020-02-02 | End: 2020-02-02

## 2020-02-02 RX ORDER — PRAMIPEXOLE DIHYDROCHLORIDE 0.25 MG/1
0.25 TABLET ORAL 3 TIMES DAILY
Status: DISCONTINUED | OUTPATIENT
Start: 2020-02-02 | End: 2020-02-04 | Stop reason: HOSPADM

## 2020-02-02 RX ORDER — PANTOPRAZOLE SODIUM 20 MG/1
20 TABLET, DELAYED RELEASE ORAL DAILY
Status: DISCONTINUED | OUTPATIENT
Start: 2020-02-02 | End: 2020-02-02

## 2020-02-02 RX ORDER — NICOTINE POLACRILEX 4 MG
15 LOZENGE BUCCAL PRN
Status: DISCONTINUED | OUTPATIENT
Start: 2020-02-02 | End: 2020-02-04 | Stop reason: HOSPADM

## 2020-02-02 RX ADMIN — MORPHINE SULFATE 15 MG: 15 TABLET ORAL at 17:29

## 2020-02-02 RX ADMIN — MORPHINE SULFATE 15 MG: 15 TABLET ORAL at 21:37

## 2020-02-02 RX ADMIN — PRAMIPEXOLE DIHYDROCHLORIDE 0.25 MG: 0.25 TABLET ORAL at 09:56

## 2020-02-02 RX ADMIN — METOPROLOL SUCCINATE 37.5 MG: 25 TABLET, FILM COATED, EXTENDED RELEASE ORAL at 09:59

## 2020-02-02 RX ADMIN — DOCUSATE SODIUM 100 MG: 100 CAPSULE, LIQUID FILLED ORAL at 21:11

## 2020-02-02 RX ADMIN — Medication 10 ML: at 21:11

## 2020-02-02 RX ADMIN — PANTOPRAZOLE SODIUM 20 MG: 20 TABLET, DELAYED RELEASE ORAL at 09:56

## 2020-02-02 RX ADMIN — SODIUM CHLORIDE 500 ML: 9 INJECTION, SOLUTION INTRAVENOUS at 21:38

## 2020-02-02 RX ADMIN — SODIUM CHLORIDE 1000 ML: 9 INJECTION, SOLUTION INTRAVENOUS at 04:23

## 2020-02-02 RX ADMIN — METHOCARBAMOL TABLETS 500 MG: 500 TABLET, COATED ORAL at 21:10

## 2020-02-02 RX ADMIN — NITROGLYCERIN 0.5 INCH: 20 OINTMENT TOPICAL at 11:54

## 2020-02-02 RX ADMIN — DULOXETINE HYDROCHLORIDE 90 MG: 30 CAPSULE, DELAYED RELEASE ORAL at 09:56

## 2020-02-02 RX ADMIN — MAGNESIUM SULFATE HEPTAHYDRATE 2 G: 40 INJECTION, SOLUTION INTRAVENOUS at 05:57

## 2020-02-02 RX ADMIN — PRAVASTATIN SODIUM 80 MG: 80 TABLET ORAL at 09:56

## 2020-02-02 RX ADMIN — HEPARIN SODIUM AND DEXTROSE 10 ML/HR: 10000; 5 INJECTION INTRAVENOUS at 16:21

## 2020-02-02 RX ADMIN — MORPHINE SULFATE 15 MG: 15 TABLET ORAL at 11:54

## 2020-02-02 RX ADMIN — PREGABALIN 200 MG: 25 CAPSULE ORAL at 09:57

## 2020-02-02 RX ADMIN — PREGABALIN 200 MG: 25 CAPSULE ORAL at 21:10

## 2020-02-02 RX ADMIN — DOCUSATE SODIUM 100 MG: 100 CAPSULE, LIQUID FILLED ORAL at 09:57

## 2020-02-02 RX ADMIN — PRAMIPEXOLE DIHYDROCHLORIDE 0.25 MG: 0.25 TABLET ORAL at 21:11

## 2020-02-02 RX ADMIN — INSULIN LISPRO 1 UNITS: 100 INJECTION, SOLUTION INTRAVENOUS; SUBCUTANEOUS at 09:51

## 2020-02-02 RX ADMIN — HEPARIN SODIUM 4000 UNITS: 1000 INJECTION INTRAVENOUS; SUBCUTANEOUS at 16:21

## 2020-02-02 RX ADMIN — METHYLPHENIDATE HYDROCHLORIDE 20 MG: 10 TABLET ORAL at 16:14

## 2020-02-02 RX ADMIN — MORPHINE SULFATE 10 MG: 4 INJECTION, SOLUTION INTRAMUSCULAR; INTRAVENOUS at 05:06

## 2020-02-02 RX ADMIN — INSULIN LISPRO 1 UNITS: 100 INJECTION, SOLUTION INTRAVENOUS; SUBCUTANEOUS at 21:12

## 2020-02-02 RX ADMIN — PRAMIPEXOLE DIHYDROCHLORIDE 0.25 MG: 0.25 TABLET ORAL at 14:22

## 2020-02-02 RX ADMIN — METHYLPHENIDATE HYDROCHLORIDE 20 MG: 10 TABLET ORAL at 09:56

## 2020-02-02 RX ADMIN — ACETAMINOPHEN 650 MG: 325 TABLET ORAL at 21:11

## 2020-02-02 RX ADMIN — ASPIRIN 81 MG: 81 TABLET, COATED ORAL at 09:56

## 2020-02-02 RX ADMIN — LEVOTHYROXINE SODIUM 100 MCG: 100 TABLET ORAL at 06:51

## 2020-02-02 RX ADMIN — NITROGLYCERIN 0.5 INCH: 20 OINTMENT TOPICAL at 17:29

## 2020-02-02 RX ADMIN — MORPHINE SULFATE 2 MG: 2 INJECTION, SOLUTION INTRAMUSCULAR; INTRAVENOUS at 04:23

## 2020-02-02 ASSESSMENT — PAIN DESCRIPTION - PAIN TYPE
TYPE: ACUTE PAIN
TYPE_2: CHRONIC PAIN
TYPE: ACUTE PAIN
TYPE: ACUTE PAIN

## 2020-02-02 ASSESSMENT — PAIN SCALES - GENERAL
PAINLEVEL_OUTOF10: 3
PAINLEVEL_OUTOF10: 6
PAINLEVEL_OUTOF10: 3
PAINLEVEL_OUTOF10: 4
PAINLEVEL_OUTOF10: 4
PAINLEVEL_OUTOF10: 6
PAINLEVEL_OUTOF10: 7
PAINLEVEL_OUTOF10: 8
PAINLEVEL_OUTOF10: 8
PAINLEVEL_OUTOF10: 4
PAINLEVEL_OUTOF10: 0
PAINLEVEL_OUTOF10: 8
PAINLEVEL_OUTOF10: 7
PAINLEVEL_OUTOF10: 4

## 2020-02-02 ASSESSMENT — PAIN DESCRIPTION - PROGRESSION
CLINICAL_PROGRESSION: NOT CHANGED
CLINICAL_PROGRESSION: GRADUALLY IMPROVING
CLINICAL_PROGRESSION: NOT CHANGED
CLINICAL_PROGRESSION_2: NOT CHANGED

## 2020-02-02 ASSESSMENT — PAIN DESCRIPTION - LOCATION
LOCATION_2: BACK
LOCATION: CHEST

## 2020-02-02 ASSESSMENT — PAIN DESCRIPTION - DESCRIPTORS
DESCRIPTORS: BURNING;TENDER
DESCRIPTORS_2: ACHING
DESCRIPTORS: ACHING;CONSTANT;RADIATING

## 2020-02-02 ASSESSMENT — PAIN DESCRIPTION - FREQUENCY
FREQUENCY: CONTINUOUS

## 2020-02-02 ASSESSMENT — PAIN DESCRIPTION - ORIENTATION
ORIENTATION: MID;UPPER
ORIENTATION_2: LOWER
ORIENTATION: MID
ORIENTATION: MID;UPPER

## 2020-02-02 ASSESSMENT — PAIN DESCRIPTION - DURATION: DURATION_2: CONTINUOUS

## 2020-02-02 ASSESSMENT — PAIN DESCRIPTION - ONSET
ONSET: ON-GOING
ONSET_2: ON-GOING
ONSET: ON-GOING
ONSET: ON-GOING

## 2020-02-02 ASSESSMENT — PAIN DESCRIPTION - INTENSITY: RATING_2: 8

## 2020-02-02 ASSESSMENT — PAIN - FUNCTIONAL ASSESSMENT: PAIN_FUNCTIONAL_ASSESSMENT: ACTIVITIES ARE NOT PREVENTED

## 2020-02-02 NOTE — PROGRESS NOTES
4 Eyes Skin Assessment     The patient is being assess for   Admission    I agree that 2 RN's have performed a thorough Head to Toe Skin Assessment on the patient. ALL assessment sites listed below have been assessed. Areas assessed by both nurses:   [x]   Head, Face, and Ears   [x]   Shoulders, Back, and Chest, Abdomen  [x]   Arms, Elbows, and Hands   [x]   Coccyx, Sacrum, and Ischium  [x]   Legs, Feet, and Heels          **SHARE this note so that the co-signing nurse is able to place an eSignature**    Co-signer eSignature: Electronically signed by Jeannette Hi RN on 2/2/20 at 6:49 AM    Does the Patient have Skin Breakdown?   No          Kwame Prevention initiated:  Yes   Wound Care Orders initiated:  no      [facility] nurse consulted for Pressure Injury (Stage 3,4, Unstageable, DTI, NWPT, Complex wounds)and New or Established Ostomies:  NA      Primary Nurse eSignature: Electronically signed by Jeannette Hi RN on 2/2/20 at 6:50 AM

## 2020-02-02 NOTE — ED NOTES
Yenifer Costello MD to bedside. Discussed placing EJ and patient declined.      [facility]  [phone]

## 2020-02-02 NOTE — ED PROVIDER NOTES
Used   Substance and Sexual Activity    Alcohol use: Yes     Comment: occasional    Drug use: No    Sexual activity: Not on file   Lifestyle    Physical activity:     Days per week: Not on file     Minutes per session: Not on file    Stress: Not on file   Relationships    Social connections:     Talks on phone: Not on file     Gets together: Not on file     Attends Jewish service: Not on file     Active member of club or organization: Not on file     Attends meetings of clubs or organizations: Not on file     Relationship status: Not on file    Intimate partner violence:     Fear of current or ex partner: Not on file     Emotionally abused: Not on file     Physically abused: Not on file     Forced sexual activity: Not on file   Other Topics Concern    Not on file   Social History Narrative    Not on file     Current Facility-Administered Medications   Medication Dose Route Frequency Provider Last Rate Last Dose    0.9 % sodium chloride bolus  1,000 mL Intravenous Once Lucrebeto El V, DO        morphine (PF) injection 2 mg  2 mg Intravenous Once Leopoldo Coke, DO         Current Outpatient Medications   Medication Sig Dispense Refill    levothyroxine (SYNTHROID) 100 MCG tablet Take 100 mcg by mouth Daily      Apixaban (ELIQUIS PO) Take by mouth      Cholecalciferol (VITAMIN D-3) 25 MCG (1000 UT) CAPS Take by mouth      methocarbamol (ROBAXIN) 500 MG tablet Take 500 mg by mouth 3 times daily as needed      Morphine Sulfate ER 15 MG T12A Take by mouth every 12 hours.  oxyCODONE (OXY-IR) 15 MG immediate release tablet Take 15 mg by mouth every 6 hours.  nicotine (NICODERM CQ) 14 MG/24HR Place 1 patch onto the skin every 24 hours      b complex vitamins capsule Take 1 capsule by mouth daily      pregabalin (LYRICA) 100 MG capsule Take 2 capsules by mouth 2 times daily for 30 days. . 6 capsule 0    metoprolol succinate (TOPROL XL) 25 MG extended release tablet Take 1 tablet by mouth daily (Patient taking differently: Take 37.5 mg by mouth daily ) 30 tablet 3    pramipexole (MIRAPEX) 0.25 MG tablet Take 0.25 mg by mouth 3 times daily Take 1 at 5pm, and 9 at bedtime  May take 1 tablet prn for breakthrough symptoms of restless leg syndrome      testosterone cypionate (DEPOTESTOTERONE CYPIONATE) 200 MG/ML injection Inject 150 mg into the muscle See Admin Instructions. Luisana Shouts DULoxetine (CYMBALTA) 60 MG extended release capsule Take 90 mg by mouth daily       aspirin 81 MG EC tablet Take 81 mg by mouth daily      pantoprazole (PROTONIX) 20 MG tablet Take 20 mg by mouth daily      metFORMIN (GLUCOPHAGE) 500 MG tablet Take 500 mg by mouth 2 times daily (with meals)      pravastatin (PRAVACHOL) 80 MG tablet Take 80 mg by mouth daily      melatonin 3 MG TABS tablet Take 6 mg by mouth nightly May take 1 additional tablet in 1 hour if no effect       Allergies   Allergen Reactions    Zolpidem      Amnesia, nightmares from past war experiences       REVIEW OF SYSTEMS  10 systems reviewed, pertinent positives per HPI otherwise noted to be negative. PHYSICAL EXAM  /75   Pulse 79   Temp 98.1 °F (36.7 °C) (Oral)   Resp 16   Ht 6' (1.829 m)   Wt 231 lb (104.8 kg)   SpO2 99%   BMI 31.33 kg/m²   GENERAL APPEARANCE: Awake and alert. Cooperative. No acute distress. HEAD: Normocephalic. Atraumatic. EYES: PERRL. EOM's grossly intact. ENT: Mucous membranes are moist.   NECK: Supple. HEART: Tachycardia irregularly. CHEST/LUNGS: Chest atraumatic, nontender, respirations unlabored. CTAB. Good air exchange. Speaking comfortably in full sentences. BACK: No midline spinal tenderness or step-off. ABDOMEN: Soft. Non-distended. Non-tender. No guarding or rebound. Normal bowel sounds. EXTREMITIES: No peripheral edema. Moves all extremities equally. All extremities neurovascularly intact. SKIN: Warm and dry. No acute rashes. NEUROLOGICAL: Alert and oriented.  CN 2-12 intact, No gross facial 1.005 - 1.030    Blood, Urine Negative Negative    pH, UA 7.0 5.0 - 8.0    Protein, UA Negative Negative mg/dL    Urobilinogen, Urine 0.2 <2.0 E.U./dL    Nitrite, Urine Negative Negative    Leukocyte Esterase, Urine Negative Negative    Microscopic Examination Not Indicated     Urine Type NotGiven     Urine Reflex to Culture Not Indicated        EKG  EKG interpreted by me. EKG #1 shows supraventricular tachycardia, normal QRS and intervals. No significant ST elevation or depression. EKG #2 shows normal sinus rhythm normal intervals , no significant ST elevations or depressions      RADIOLOGY  X-RAYS:  I have reviewed radiologic plain film image(s). ALL OTHER NON-PLAIN FILM IMAGES SUCH AS CT, ULTRASOUND AND MRI HAVE BEEN READ BY THE RADIOLOGIST. XR CHEST PORTABLE   Final Result   No acute findings in the chest.              CRITICAL CARE TIME ATTESTATION (45 minutes):    Due to the high probability of imminent or life-threatening deterioration this patient required my direct attention, interventions and personal management. I personally provided 45 minutes of critical care time exclusive of time spent on separate billable procedures. Time includes review and interpretation of laboratory data, review and interpretation of radiology results, discussions with consultants as applicable while monitoring for potential decompensation. Interventions were otherwise performed as documented above. ED COURSE/MDM  Patient seen and evaluated. Patient presents here in SVT he ended up converting on his own to normal sinus rhythm but then back to SVT and this occurred for 2 episodes. SVT his heart rate got up to the 180s. He was also having some dizziness with this. I did not give him any medications as he ended up converting on his own and these episodes appear to be very brief.   Patient is also very hard stick in his initial peripheral IV in the right line became unusable going to have L1 in his lower Cipriano 40, DO  02/02/20 [id number] S Celeste Bhatti, DO  02/02/20 5926

## 2020-02-02 NOTE — H&P
Hospital Medicine History & Physical       PCP: No primary care provider on file. Date of Admission: 2/2/2020    Date of Service: Pt seen/examined on 2/2/2020 and Admitted to Inpatient with expected LOS greater than two midnights due to medical therapy. .    Chief Complaint: Irregular Heartbeat       History Of Present Illness: 69 y.o. male, with a past medical history of atrial fibrillation on Eliquis, atrial flutter s/p ablation x 2 years ago, Pacemaker, HTN and hyperlipidemia  who presented to [facility] with irregular heartbeat. States his heart rate was elevated to 200 at home. He was laying in bed when this happened. Accompanied by midsternal chest pain that radiated into the jaw, lightheadedness, headache, shortness of breath and left arm heaviness. Denies nausea and vomiting, diaphoresis or syncope. He has had this feeling several times recently and believed it to be acid reflux. In the ER, EKG showed SVT with a rate of 189. He converted to sinus rhythm on his own, this happened two times in the ED. Pacemaker was interrogated, showed 24 episodes of SVT, 12 episodes of atrial fibrillation/atrial flutter, 8 episodes of NSVT, and ventricular rates in the 200's at times. His magnesium was 1.6. Past Medical History:          Diagnosis Date    Arthritis     Atrial fibrillation [facility])     Cardiac pacemaker     Chronic back pain     Diabetes mellitus ([facility] [address]     History of blood transfusion     Hyperlipidemia     Hypertension     Psychiatric problem     PTSD    Thyroid disease        Past Surgical History:          Procedure Laterality Date    COLONOSCOPY      ENDOSCOPY, COLON, DIAGNOSTIC      EYE SURGERY      Left eye cornia and cataracts    FRACTURE SURGERY      Left hand    PACEMAKER PLACEMENT      SKIN BIOPSY      Right forearm       Medications Prior to Admission:      Prior to Admission medications    Medication Sig Start Date End Date Taking?  Authorizing Provider   levothyroxine rashes or lesions. Neurologic:  Neurovascularly intact without any focal sensory/motor deficits. Cranial nerves: II-XII intact, grossly non-focal.  Psychiatric:  Alert and oriented, thought content appropriate, normal insight  Capillary Refill: Brisk,< 3 seconds   Peripheral Pulses: +2 palpable, equal bilaterally       Labs:     Recent Labs     02/02/20 0329   WBC 7.3   HGB 14.0   HCT 40.7        Recent Labs     02/02/20  0329 02/02/20  0923    137   K 4.1 4.3    101   CO2 25 25   BUN 13 13   CREATININE 1.2 0.9   CALCIUM 9.2 8.6     Recent Labs     02/02/20  0329   AST 20   ALT 19   BILITOT <0.2   ALKPHOS 74     No results for input(s): INR in the last 72 hours.   Recent Labs     02/02/20 0329 02/02/20  0759   TROPONINI <0.01 <0.01       Urinalysis:      Lab Results   Component Value Date    NITRU Negative 02/02/2020    BLOODU Negative 02/02/2020    SPECGRAV <=1.005 02/02/2020    GLUCOSEU Negative 02/02/2020       Radiology:     CXR: I have reviewed the CXR with the following interpretation: negative  EKG:  I have reviewed the EKG with the following interpretation: SVT, rate 168     XR CHEST PORTABLE   Final Result   No acute findings in the chest.         NM MYOCARDIAL SPECT REST EXERCISE OR RX    (Results Pending)       ASSESSMENT:    Active Hospital Problems    Diagnosis Date Noted    Cardiac arrhythmia [I49.9] 02/02/2020    Hypomagnesemia [E83.42] 02/02/2020    SVT (supraventricular tachycardia) (HCC) [I47.1]     Chest pain [R07.9]     S/P placement of cardiac pacemaker [Z95.0]     Chronic pain syndrome [G89.4] 07/19/2018    Benign essential HTN [I10]          PLAN:  Cardiac arrhythmia present on arrival  - SVT in the ED   - currently in sinus rhythm  - Eliquis on hold, heparin gtt due to possibility of cardiac cath   - Stress test in the AM, NPO after midnight  - Cardiology consulted  - Cardiology increased Toprol XL to 50 mg daily    Hypomagnesemia  - received Magnesium 2 g IV   - Will reheck     Chest Pain   -Serial troponin negative  - No ischemia on EKG  - Continue ASA, statin and beta blocker  - planning for stress test as above    S/P placement of cardiac pacemaker  - Interrogation in ED showed normal function   - 24 episodes of SVT, 12 episodes of atrial fibrillation/atrial flutter, 8 episodes of NSVT, and ventricular rates in the 200's at times  - EP to see tomorrow     Diabetes  - Hold home Metformin  - Low dose SSI  - Check Hgb A1C    Chronic Pain  - Continue home dose of Morphine IR and Lyrica     Hypertension  - Controlled  - Toprol increased to 50 mg daily    Hyperlipidemia  - Will check lipid panel  - Continue statin     Obesity  - With Body mass index is 32.1 kg/m². Complicating assessment and treatment. Placing patient at risk for multiple co-morbidities as well as early death and contributing to the patient's presentation. Counseled on weight loss      DVT Prophylaxis: heparin gtt  Diet: DIET CARB CONTROL; Diet NPO Time Specified  Code Status: Full Code    PT/OT Eval Status: Not indicated    Dispo - pending stress findings        Ramo Moreno, APRN - CNP    Thank you No primary care provider on file. for the opportunity to be involved in this patient's care. If you have any questions or concerns please feel free to contact me at [phone].

## 2020-02-02 NOTE — ED NOTES
Patient is resting in bed with wife at bedside. Patient IV infusing at this time.       Marcello Gaitan, [facility]  02/02/20 6430

## 2020-02-02 NOTE — PROGRESS NOTES
Pt admitted to Room 364-01  Pt a/o. VSS. [employer] to room. Denies c/o pain. Assessment complete as charted. Call light in reach. Denies other needs. Will continue to monitor.

## 2020-02-02 NOTE — ED NOTES
Bed: 03  Expected date:   Expected time:   Means of arrival:   Comments:  aretha Nicholas, [place]  [phone]

## 2020-02-02 NOTE — CONSULTS
(ELIQUIS) tablet 5 mg, BID  aspirin EC tablet 81 mg, Daily  DULoxetine (CYMBALTA) extended release capsule 90 mg, Daily  levothyroxine (SYNTHROID) tablet 100 mcg, Daily  methocarbamol (ROBAXIN) tablet 500 mg, TID PRN  metoprolol succinate (TOPROL XL) extended release tablet 37.5 mg, Daily  oxyCODONE (OXY-IR) immediate release tablet 15 mg, Q6H  pantoprazole (PROTONIX) tablet 20 mg, Daily  pramipexole (MIRAPEX) tablet 0.25 mg, TID  pravastatin (PRAVACHOL) tablet 80 mg, Daily  pregabalin (LYRICA) capsule 200 mg, BID  sodium chloride flush 0.9 % injection 10 mL, 2 times per day  sodium chloride flush 0.9 % injection 10 mL, PRN  magnesium hydroxide (MILK OF MAGNESIA) 400 MG/5ML suspension 30 mL, Daily PRN  ondansetron (ZOFRAN) injection 4 mg, Q6H PRN  glucose (GLUTOSE) 40 % oral gel 15 g, PRN  dextrose 50 % IV solution, PRN  glucagon (rDNA) injection 1 mg, PRN  dextrose 5 % solution, PRN  insulin lispro (HUMALOG) injection vial 0-6 Units, TID WC  insulin lispro (HUMALOG) injection vial 0-3 Units, Nightly  acetaminophen (TYLENOL) tablet 650 mg, Q4H PRN  nitroGLYCERIN (NITROSTAT) SL tablet 0.4 mg, Q5 Min PRN  docusate sodium (COLACE) capsule 100 mg, BID  methylphenidate (RITALIN) tablet 20 mg, BID  nitroglycerin (NITRO-BID) 2 % ointment 0.5 inch, 4 times per day        Allergies:  Requip [ropinirole hcl] and Zolpidem     Review of Systems:   A 14 point review of symptoms completed. Pertinent positives identified in the HPI, all other review of symptoms negative as below.       Objective   PHYSICAL EXAM:    Vitals:    02/02/20 0959   BP: 122/69   Pulse: 61   Resp:    Temp:    SpO2:     Weight: 236 lb (107 kg)         General Appearance:  Alert, cooperative, no distress, appears stated age   Head:  Normocephalic, without obvious abnormality, atraumatic   Eyes:  PERRL, conjunctiva/corneas clear   Nose: Nares normal, no drainage or sinus tenderness   Throat: Lips, mucosa, and tongue normal   Neck: Supple, symmetrical, trachea

## 2020-02-03 ENCOUNTER — APPOINTMENT (OUTPATIENT)
Dept: NUCLEAR MEDICINE | Age: 75
DRG: 310 | End: 2020-02-03
Payer: OTHER GOVERNMENT

## 2020-02-03 LAB
ANION GAP SERPL CALCULATED.3IONS-SCNC: 9 MMOL/L (ref 3–16)
APTT: 55.4 SEC (ref 24.2–36.2)
BUN BLDV-MCNC: 11 MG/DL (ref 7–20)
CALCIUM SERPL-MCNC: 8.8 MG/DL (ref 8.3–10.6)
CHLORIDE BLD-SCNC: 102 MMOL/L (ref 99–110)
CHOLESTEROL, TOTAL: 146 MG/DL (ref 0–199)
CO2: 27 MMOL/L (ref 21–32)
CREAT SERPL-MCNC: 1 MG/DL (ref 0.8–1.3)
GFR AFRICAN AMERICAN: >60
GFR NON-AFRICAN AMERICAN: >60
GLUCOSE BLD-MCNC: 160 MG/DL (ref 70–99)
GLUCOSE BLD-MCNC: 170 MG/DL (ref 70–99)
GLUCOSE BLD-MCNC: 191 MG/DL (ref 70–99)
GLUCOSE BLD-MCNC: 202 MG/DL (ref 70–99)
GLUCOSE BLD-MCNC: 295 MG/DL (ref 70–99)
HDLC SERPL-MCNC: 48 MG/DL (ref 40–60)
LDL CHOLESTEROL CALCULATED: 63 MG/DL
LV EF: 60 %
LVEF MODALITY: NORMAL
MAGNESIUM: 1.9 MG/DL (ref 1.8–2.4)
PERFORMED ON: ABNORMAL
POTASSIUM SERPL-SCNC: 4.4 MMOL/L (ref 3.5–5.1)
SODIUM BLD-SCNC: 138 MMOL/L (ref 136–145)
TRIGL SERPL-MCNC: 174 MG/DL (ref 0–150)
VLDLC SERPL CALC-MCNC: 35 MG/DL

## 2020-02-03 PROCEDURE — 6370000000 HC RX 637 (ALT 250 FOR IP): Performed by: INTERNAL MEDICINE

## 2020-02-03 PROCEDURE — 6360000002 HC RX W HCPCS: Performed by: INTERNAL MEDICINE

## 2020-02-03 PROCEDURE — 78452 HT MUSCLE IMAGE SPECT MULT: CPT

## 2020-02-03 PROCEDURE — 85730 THROMBOPLASTIN TIME PARTIAL: CPT

## 2020-02-03 PROCEDURE — 1200000000 HC SEMI PRIVATE

## 2020-02-03 PROCEDURE — 80048 BASIC METABOLIC PNL TOTAL CA: CPT

## 2020-02-03 PROCEDURE — 93017 CV STRESS TEST TRACING ONLY: CPT

## 2020-02-03 PROCEDURE — 83735 ASSAY OF MAGNESIUM: CPT

## 2020-02-03 PROCEDURE — 3430000000 HC RX DIAGNOSTIC RADIOPHARMACEUTICAL: Performed by: INTERNAL MEDICINE

## 2020-02-03 PROCEDURE — 36415 COLL VENOUS BLD VENIPUNCTURE: CPT

## 2020-02-03 PROCEDURE — A9502 TC99M TETROFOSMIN: HCPCS | Performed by: INTERNAL MEDICINE

## 2020-02-03 PROCEDURE — 6370000000 HC RX 637 (ALT 250 FOR IP): Performed by: NURSE PRACTITIONER

## 2020-02-03 PROCEDURE — 6360000002 HC RX W HCPCS: Performed by: NURSE PRACTITIONER

## 2020-02-03 PROCEDURE — 2580000003 HC RX 258: Performed by: INTERNAL MEDICINE

## 2020-02-03 PROCEDURE — 99223 1ST HOSP IP/OBS HIGH 75: CPT | Performed by: INTERNAL MEDICINE

## 2020-02-03 RX ORDER — CHOLECALCIFEROL (VITAMIN D3) 125 MCG
5 CAPSULE ORAL NIGHTLY PRN
Status: DISCONTINUED | OUTPATIENT
Start: 2020-02-03 | End: 2020-02-04 | Stop reason: HOSPADM

## 2020-02-03 RX ORDER — METOPROLOL SUCCINATE 50 MG/1
50 TABLET, EXTENDED RELEASE ORAL 2 TIMES DAILY
Status: DISCONTINUED | OUTPATIENT
Start: 2020-02-03 | End: 2020-02-04 | Stop reason: HOSPADM

## 2020-02-03 RX ADMIN — DULOXETINE HYDROCHLORIDE 90 MG: 30 CAPSULE, DELAYED RELEASE ORAL at 08:48

## 2020-02-03 RX ADMIN — INSULIN LISPRO 1 UNITS: 100 INJECTION, SOLUTION INTRAVENOUS; SUBCUTANEOUS at 21:34

## 2020-02-03 RX ADMIN — Medication 10 ML: at 21:20

## 2020-02-03 RX ADMIN — PRAMIPEXOLE DIHYDROCHLORIDE 0.25 MG: 0.25 TABLET ORAL at 14:40

## 2020-02-03 RX ADMIN — METOPROLOL SUCCINATE 50 MG: 50 TABLET, EXTENDED RELEASE ORAL at 10:45

## 2020-02-03 RX ADMIN — MORPHINE SULFATE 15 MG: 15 TABLET ORAL at 21:31

## 2020-02-03 RX ADMIN — TETROFOSMIN 10.2 MILLICURIE: 1.38 INJECTION, POWDER, LYOPHILIZED, FOR SOLUTION INTRAVENOUS at 11:44

## 2020-02-03 RX ADMIN — PRAMIPEXOLE DIHYDROCHLORIDE 0.25 MG: 0.25 TABLET ORAL at 21:20

## 2020-02-03 RX ADMIN — APIXABAN 5 MG: 5 TABLET, FILM COATED ORAL at 21:20

## 2020-02-03 RX ADMIN — NITROGLYCERIN 0.5 INCH: 20 OINTMENT TOPICAL at 00:17

## 2020-02-03 RX ADMIN — PRAVASTATIN SODIUM 80 MG: 80 TABLET ORAL at 08:48

## 2020-02-03 RX ADMIN — NITROGLYCERIN 0.5 INCH: 20 OINTMENT TOPICAL at 05:19

## 2020-02-03 RX ADMIN — METHYLPHENIDATE HYDROCHLORIDE 20 MG: 10 TABLET ORAL at 07:46

## 2020-02-03 RX ADMIN — MORPHINE SULFATE 15 MG: 15 TABLET ORAL at 14:40

## 2020-02-03 RX ADMIN — NITROGLYCERIN 0.5 INCH: 20 OINTMENT TOPICAL at 17:39

## 2020-02-03 RX ADMIN — DOCUSATE SODIUM 100 MG: 100 CAPSULE, LIQUID FILLED ORAL at 21:20

## 2020-02-03 RX ADMIN — LEVOTHYROXINE SODIUM 100 MCG: 100 TABLET ORAL at 05:19

## 2020-02-03 RX ADMIN — METHYLPHENIDATE HYDROCHLORIDE 20 MG: 10 TABLET ORAL at 14:40

## 2020-02-03 RX ADMIN — PREGABALIN 200 MG: 25 CAPSULE ORAL at 08:48

## 2020-02-03 RX ADMIN — HEPARIN SODIUM AND DEXTROSE 10 ML/HR: 10000; 5 INJECTION INTRAVENOUS at 16:21

## 2020-02-03 RX ADMIN — REGADENOSON 0.4 MG: 0.08 INJECTION, SOLUTION INTRAVENOUS at 13:20

## 2020-02-03 RX ADMIN — PANTOPRAZOLE SODIUM 20 MG: 20 TABLET, DELAYED RELEASE ORAL at 05:19

## 2020-02-03 RX ADMIN — TETROFOSMIN 30.7 MILLICURIE: 1.38 INJECTION, POWDER, LYOPHILIZED, FOR SOLUTION INTRAVENOUS at 13:20

## 2020-02-03 RX ADMIN — INSULIN LISPRO 1 UNITS: 100 INJECTION, SOLUTION INTRAVENOUS; SUBCUTANEOUS at 17:41

## 2020-02-03 RX ADMIN — METOPROLOL SUCCINATE 50 MG: 50 TABLET, EXTENDED RELEASE ORAL at 21:20

## 2020-02-03 RX ADMIN — ASPIRIN 81 MG: 81 TABLET, COATED ORAL at 08:48

## 2020-02-03 RX ADMIN — DOCUSATE SODIUM 100 MG: 100 CAPSULE, LIQUID FILLED ORAL at 08:48

## 2020-02-03 RX ADMIN — PREGABALIN 200 MG: 25 CAPSULE ORAL at 21:20

## 2020-02-03 RX ADMIN — PRAMIPEXOLE DIHYDROCHLORIDE 0.25 MG: 0.25 TABLET ORAL at 08:51

## 2020-02-03 RX ADMIN — MORPHINE SULFATE 15 MG: 15 TABLET ORAL at 07:46

## 2020-02-03 RX ADMIN — MELATONIN TAB 5 MG 5 MG: 5 TAB at 03:23

## 2020-02-03 ASSESSMENT — PAIN DESCRIPTION - ONSET: ONSET: ON-GOING

## 2020-02-03 ASSESSMENT — PAIN SCALES - GENERAL
PAINLEVEL_OUTOF10: 6
PAINLEVEL_OUTOF10: 6
PAINLEVEL_OUTOF10: 8
PAINLEVEL_OUTOF10: 6
PAINLEVEL_OUTOF10: 4
PAINLEVEL_OUTOF10: 6

## 2020-02-03 ASSESSMENT — PAIN DESCRIPTION - PROGRESSION: CLINICAL_PROGRESSION: NOT CHANGED

## 2020-02-03 ASSESSMENT — PAIN DESCRIPTION - FREQUENCY: FREQUENCY: CONTINUOUS

## 2020-02-03 ASSESSMENT — PAIN DESCRIPTION - PAIN TYPE
TYPE: ACUTE PAIN
TYPE: CHRONIC PAIN

## 2020-02-03 ASSESSMENT — PAIN DESCRIPTION - DESCRIPTORS: DESCRIPTORS: ACHING;BURNING

## 2020-02-03 ASSESSMENT — PAIN DESCRIPTION - LOCATION
LOCATION: BACK
LOCATION: CHEST

## 2020-02-03 ASSESSMENT — PAIN DESCRIPTION - ORIENTATION: ORIENTATION: MID;UPPER

## 2020-02-03 NOTE — PROGRESS NOTES
Pt arrived in department and complained of feeling \"very unwell and dizzy\". Pt placed in stretcher. /91 HR 64. Pt feeling better lying down. WCTM.

## 2020-02-03 NOTE — PROGRESS NOTES
Hospital Medicine Progress Notes     PCP: No primary care provider on file. Date of Admission: 2/2/2020    Chief Complaint: Irregular Heartbeat     History Of Present Illness: 69 y.o. male, with a past medical history of atrial fibrillation on Eliquis, atrial flutter s/p ablation x 2 years ago, Pacemaker, HTN and hyperlipidemia  who presented to [facility] with irregular heartbeat. States his heart rate was elevated to 200 at home. He was laying in bed when this happened. Accompanied by midsternal chest pain that radiated into the jaw, lightheadedness, headache, shortness of breath and left arm heaviness. Denies nausea and vomiting, diaphoresis or syncope. He has had this feeling several times recently and believed it to be acid reflux. In the ER, EKG showed SVT with a rate of 189. He converted to sinus rhythm on his own, this happened two times in the ED. Pacemaker was interrogated, showed 24 episodes of SVT, 12 episodes of atrial fibrillation/atrial flutter, 8 episodes of NSVT, and ventricular rates in the 200's at times. His magnesium was 1.6. Subjective:  Pending Stress test this am.   Not feeling well. Cant describe. Just feels \"off\"    Past Medical History:          Diagnosis Date    Arthritis     Atrial fibrillation ([address]     Cardiac pacemaker     Chronic back pain     Diabetes mellitus ([address]     History of blood transfusion     Hyperlipidemia     Hypertension     Psychiatric problem     PTSD    Thyroid disease        Past Surgical History:          Procedure Laterality Date    COLONOSCOPY      ENDOSCOPY, COLON, DIAGNOSTIC      EYE SURGERY      Left eye cornia and cataracts    FRACTURE SURGERY      Left hand    PACEMAKER PLACEMENT      SKIN BIOPSY      Right forearm       Medications Prior to Admission:      Prior to Admission medications    Medication Sig Start Date End Date Taking?  Authorizing Provider   levothyroxine (SYNTHROID) 100 MCG tablet Take 100 mcg by mouth Daily Yes Historical Provider, MD   Apixaban (ELIQUIS PO) Take 5 mg by mouth 2 times daily    Yes Historical Provider, MD   Cholecalciferol (VITAMIN D-3) 25 MCG (1000 UT) CAPS Take by mouth   Yes Historical Provider, MD   methocarbamol (ROBAXIN) 500 MG tablet Take 500 mg by mouth 3 times daily as needed   Yes Historical Provider, MD   Morphine Sulfate ER 15 MG T12A Take 15 mg by mouth every 4 hours as needed. Maximum of 41/2 doses daily. Yes Historical Provider, MD   nicotine (NICODERM CQ) 14 MG/24HR Place 1 patch onto the skin every 24 hours   Yes Historical Provider, MD   methylphenidate (RITALIN) 10 MG tablet Take 20 mg by mouth 2 times daily. Yes Historical Provider, MD   amLODIPine (NORVASC) 10 MG tablet Take by mouth daily   Yes Historical Provider, MD   testosterone (ANDROGEL) 25 MG/2.5GM (1%) GEL 1 % gel Apply 5 g topically daily. 2 pumps once a day. Yes Historical Provider, MD   docusate sodium (COLACE) 100 MG capsule Take 100 mg by mouth 2 times daily   Yes Historical Provider, MD   metoprolol succinate (TOPROL XL) 25 MG extended release tablet Take 1 tablet by mouth daily  Patient taking differently: Take 37.5 mg by mouth daily  7/19/18  Yes Jessica Reyna MD   pramipexole (MIRAPEX) 0.25 MG tablet Take 0.25 mg by mouth 3 times daily Take 1 at 5pm, and 9 at bedtime as needed and one later at night as needed.   May take 1 tablet prn for breakthrough symptoms of restless leg syndrome   Yes Historical Provider, MD   DULoxetine (CYMBALTA) 60 MG extended release capsule Take 90 mg by mouth daily    Yes Historical Provider, MD   aspirin 81 MG EC tablet Take 81 mg by mouth daily   Yes Historical Provider, MD   pantoprazole (PROTONIX) 20 MG tablet Take 20 mg by mouth daily   Yes Historical Provider, MD   metFORMIN (GLUCOPHAGE) 500 MG tablet Take 500 mg by mouth 2 times daily (with meals)   Yes Historical Provider, MD   pravastatin (PRAVACHOL) 80 MG tablet Take 80 mg by mouth daily   Yes Historical Provider, MD without any focal sensory/motor deficits. Cranial nerves: II-XII intact, grossly non-focal.  Psychiatric:  Alert and oriented, thought content appropriate, normal insight  Capillary Refill: Brisk,< 3 seconds   Peripheral Pulses: +2 palpable, equal bilaterally       Labs:     Recent Labs     02/02/20  0329 02/02/20  1456   WBC 7.3 7.5   HGB 14.0 13.4*   HCT 40.7 39.4*    167     Recent Labs     02/02/20  0329 02/02/20  0923 02/03/20  0515    137 138   K 4.1 4.3 4.4    101 102   CO2 25 25 27   BUN 13 13 11   CREATININE 1.2 0.9 1.0   CALCIUM 9.2 8.6 8.8     Recent Labs     02/02/20  0329   AST 20   ALT 19   BILITOT <0.2   ALKPHOS 74     No results for input(s): INR in the last 72 hours.   Recent Labs     02/02/20  0329 02/02/20  0759 02/02/20  1214   TROPONINI <0.01 <0.01 <0.01       Urinalysis:      Lab Results   Component Value Date    NITRU Negative 02/02/2020    BLOODU Negative 02/02/2020    SPECGRAV <=1.005 02/02/2020    GLUCOSEU Negative 02/02/2020       Radiology:     CXR: I have reviewed the CXR with the following interpretation: negative  EKG:  I have reviewed the EKG with the following interpretation: SVT, rate 168     XR CHEST PORTABLE   Final Result   No acute findings in the chest.         NM Cardiac Stress Test Nuclear Imaging    (Results Pending)       ASSESSMENT:    Active Hospital Problems    Diagnosis Date Noted    Cardiac arrhythmia [I49.9] 02/02/2020    Hypomagnesemia [E83.42] 02/02/2020    SVT (supraventricular tachycardia) (HCC) [I47.1]     Chest pain [R07.9]     S/P placement of cardiac pacemaker [Z95.0]     Chronic pain syndrome [G89.4] 07/19/2018    Benign essential HTN [I10]          PLAN:  Cardiac arrhythmia present on arrival  - SVT in the ED   - currently in sinus rhythm  - Eliquis on hold, heparin gtt due to possibility of cardiac cath   - Stress test this AM, NPO   - Cardiology consulted- EP eval pending.   - Cardiology increased Toprol XL to 50 mg daily    Hypomagnesemia  - received Magnesium 2 g IV   - 1.9 today, goal >2    Chest Pain   -Serial troponin negative  - No ischemia on EKG  - Continue ASA, statin and beta blocker  - planning for stress test as above    S/P placement of cardiac pacemaker  - Interrogation in ED showed normal function   - 24 episodes of SVT, 12 episodes of atrial fibrillation/atrial flutter, 8 episodes of NSVT, and ventricular rates in the 200's at times  - EP to see today    Diabetes, HgbA1C 7.0  - Hold home Metformin  - Low dose SSI    Chronic Pain  - Continue home dose of Morphine IR and Lyrica     Hypertension  - Controlled  - Toprol increased to 50 mg daily    Hyperlipidemia  - lipid panel reviewed  - Continue statin     Obesity  - With Body mass index is 32.1 kg/m². Complicating assessment and treatment. Placing patient at risk for multiple co-morbidities as well as early death and contributing to the patient's presentation. Counseled on weight loss      DVT Prophylaxis: heparin gtt  Diet: Diet NPO Time Specified  Code Status: Full Code    PT/OT Eval Status: Not indicated    Dispo - pending stress findings        NIXON Saravia - CNP    Thank you No primary care provider on file. for the opportunity to be involved in this patient's care. If you have any questions or concerns please feel free to contact me at [phone].

## 2020-02-03 NOTE — CONSULTS
confusion, dizziness, headaches   · Psychiatric: No anxiety, no depression. · Dermatological: negative for - rash    Physical Examination:  Vitals:    20 0746   BP: (!) 119/56   Pulse: 63   Resp: 16   Temp: 97.9 °F (36.6 °C)   SpO2: 93%        Intake/Output Summary (Last 24 hours) at 2/3/2020 1410  Last data filed at 2/3/2020 0739  Gross per 24 hour   Intake 649 ml   Output 2600 ml   Net -1951 ml     In: 649 [P.O.:480; I.V.:169]  Out: 2600    Wt Readings from Last 3 Encounters:   20 236 lb (107 kg)   19 222 lb (100.7 kg)   19 216 lb 14.9 oz (98.4 kg)     Temp  Av.6 °F (36.4 °C)  Min: 97.2 °F (36.2 °C)  Max: 97.9 °F (36.6 °C)  Pulse  Av.5  Min: 62  Max: 66  BP  Min: 104/60  Max: 126/74  SpO2  Av.3 %  Min: 93 %  Max: 97 %    · Telemetry: Sinus rhythm   · Constitutional: Alert. Oriented to person, place, and time. No distress. · Neck: Neck supple. No lymphadenopathy. No rigidity. No JVD present. · Cardiovascular: Normal rate, regular rhythm. Normal S1&S2. Carotid pulse 2+ bilaterally. · Pulmonary/Chest: Bilateral respiratory sounds present. No respiratory accessory muscle use. · Abdominal: Soft. Normal bowel sounds present. No distension, No tenderness. · Musculoskeletal: No tenderness. No edema    · Lymphadenopathy: Has no cervical adenopathy. · Neurological: Alert and oriented. Cranial nerve II-XII grossly intact. · Skin: Skin is warm and dry. No rash, lesions, ulcerations noted. · Psychiatric: No anxiety nor agitation. Labs:  Reviewed.    Recent Labs     20  03220  0923 20  0515    137 138   K 4.1 4.3 4.4    101 102   CO2 25 25 27   BUN 13 13 11   CREATININE 1.2 0.9 1.0     Recent Labs     20  0329 20  1456   WBC 7.3 7.5   HGB 14.0 13.4*   HCT 40.7 39.4*   MCV 93.4 94.8    167     Lab Results   Component Value Date    TROPONINI <0.01 2020     No results found for: BNP  Lab Results   Component Value Date

## 2020-02-04 VITALS
OXYGEN SATURATION: 95 % | WEIGHT: 236 LBS | HEART RATE: 62 BPM | BODY MASS INDEX: 31.97 KG/M2 | TEMPERATURE: 97.7 F | HEIGHT: 72 IN | RESPIRATION RATE: 18 BRPM | SYSTOLIC BLOOD PRESSURE: 124 MMHG | DIASTOLIC BLOOD PRESSURE: 80 MMHG

## 2020-02-04 LAB
GLUCOSE BLD-MCNC: 146 MG/DL (ref 70–99)
GLUCOSE BLD-MCNC: 182 MG/DL (ref 70–99)
PERFORMED ON: ABNORMAL
PERFORMED ON: ABNORMAL
PLATELET # BLD: 159 K/UL (ref 135–450)

## 2020-02-04 PROCEDURE — 6370000000 HC RX 637 (ALT 250 FOR IP): Performed by: INTERNAL MEDICINE

## 2020-02-04 PROCEDURE — 6370000000 HC RX 637 (ALT 250 FOR IP): Performed by: NURSE PRACTITIONER

## 2020-02-04 PROCEDURE — 99233 SBSQ HOSP IP/OBS HIGH 50: CPT | Performed by: NURSE PRACTITIONER

## 2020-02-04 PROCEDURE — 36415 COLL VENOUS BLD VENIPUNCTURE: CPT

## 2020-02-04 PROCEDURE — 85049 AUTOMATED PLATELET COUNT: CPT

## 2020-02-04 PROCEDURE — 90471 IMMUNIZATION ADMIN: CPT | Performed by: NURSE PRACTITIONER

## 2020-02-04 PROCEDURE — 90715 TDAP VACCINE 7 YRS/> IM: CPT | Performed by: NURSE PRACTITIONER

## 2020-02-04 PROCEDURE — 2580000003 HC RX 258: Performed by: INTERNAL MEDICINE

## 2020-02-04 PROCEDURE — 6360000002 HC RX W HCPCS: Performed by: NURSE PRACTITIONER

## 2020-02-04 RX ORDER — METOPROLOL SUCCINATE 50 MG/1
50 TABLET, EXTENDED RELEASE ORAL 2 TIMES DAILY
Qty: 60 TABLET | Refills: 5 | Status: ON HOLD | OUTPATIENT
Start: 2020-02-04 | End: 2020-02-10 | Stop reason: SDUPTHER

## 2020-02-04 RX ORDER — VITAMIN B COMPLEX
1 CAPSULE ORAL DAILY
Qty: 30 CAPSULE | Refills: 3 | Status: SHIPPED | OUTPATIENT
Start: 2020-02-04

## 2020-02-04 RX ADMIN — PRAMIPEXOLE DIHYDROCHLORIDE 0.25 MG: 0.25 TABLET ORAL at 01:04

## 2020-02-04 RX ADMIN — DOCUSATE SODIUM 100 MG: 100 CAPSULE, LIQUID FILLED ORAL at 08:35

## 2020-02-04 RX ADMIN — PRAMIPEXOLE DIHYDROCHLORIDE 0.25 MG: 0.25 TABLET ORAL at 08:36

## 2020-02-04 RX ADMIN — Medication 10 ML: at 08:37

## 2020-02-04 RX ADMIN — METHYLPHENIDATE HYDROCHLORIDE 20 MG: 10 TABLET ORAL at 14:24

## 2020-02-04 RX ADMIN — PRAMIPEXOLE DIHYDROCHLORIDE 0.25 MG: 0.25 TABLET ORAL at 14:24

## 2020-02-04 RX ADMIN — LEVOTHYROXINE SODIUM 100 MCG: 100 TABLET ORAL at 06:31

## 2020-02-04 RX ADMIN — METHYLPHENIDATE HYDROCHLORIDE 20 MG: 10 TABLET ORAL at 06:31

## 2020-02-04 RX ADMIN — TETANUS TOXOID, REDUCED DIPHTHERIA TOXOID AND ACELLULAR PERTUSSIS VACCINE, ADSORBED 0.5 ML: 5; 2.5; 8; 8; 2.5 SUSPENSION INTRAMUSCULAR at 15:30

## 2020-02-04 RX ADMIN — PRAVASTATIN SODIUM 80 MG: 80 TABLET ORAL at 08:36

## 2020-02-04 RX ADMIN — PREGABALIN 200 MG: 25 CAPSULE ORAL at 08:36

## 2020-02-04 RX ADMIN — INSULIN LISPRO 1 UNITS: 100 INJECTION, SOLUTION INTRAVENOUS; SUBCUTANEOUS at 12:25

## 2020-02-04 RX ADMIN — APIXABAN 5 MG: 5 TABLET, FILM COATED ORAL at 08:36

## 2020-02-04 RX ADMIN — DULOXETINE HYDROCHLORIDE 90 MG: 30 CAPSULE, DELAYED RELEASE ORAL at 08:35

## 2020-02-04 RX ADMIN — PANTOPRAZOLE SODIUM 20 MG: 20 TABLET, DELAYED RELEASE ORAL at 06:31

## 2020-02-04 RX ADMIN — NITROGLYCERIN 0.5 INCH: 20 OINTMENT TOPICAL at 01:04

## 2020-02-04 RX ADMIN — MORPHINE SULFATE 15 MG: 15 TABLET ORAL at 14:24

## 2020-02-04 RX ADMIN — NITROGLYCERIN 0.5 INCH: 20 OINTMENT TOPICAL at 06:31

## 2020-02-04 RX ADMIN — INSULIN LISPRO 1 UNITS: 100 INJECTION, SOLUTION INTRAVENOUS; SUBCUTANEOUS at 08:37

## 2020-02-04 RX ADMIN — ASPIRIN 81 MG: 81 TABLET, COATED ORAL at 08:35

## 2020-02-04 RX ADMIN — MORPHINE SULFATE 15 MG: 15 TABLET ORAL at 02:23

## 2020-02-04 RX ADMIN — MORPHINE SULFATE 15 MG: 15 TABLET ORAL at 06:31

## 2020-02-04 RX ADMIN — METOPROLOL SUCCINATE 50 MG: 50 TABLET, EXTENDED RELEASE ORAL at 08:35

## 2020-02-04 ASSESSMENT — PAIN DESCRIPTION - INTENSITY: RATING_2: 2

## 2020-02-04 ASSESSMENT — PAIN SCALES - GENERAL
PAINLEVEL_OUTOF10: 6
PAINLEVEL_OUTOF10: 7

## 2020-02-04 ASSESSMENT — PAIN DESCRIPTION - LOCATION
LOCATION: BACK
LOCATION_2: CHEST
LOCATION: BACK

## 2020-02-04 ASSESSMENT — PAIN DESCRIPTION - PAIN TYPE
TYPE: CHRONIC PAIN
TYPE: CHRONIC PAIN
TYPE_2: ACUTE PAIN

## 2020-02-04 NOTE — PROGRESS NOTES
[address]     Electrophysiology                                     Progress Note    Admission date:  2020    Reason for follow up visit: afib    HPI/CC: Antonino Fam was admitted on 2020 with chest pain and palpitations. EKG showed afib with a HR of 189 and he spontaneously converted to sinus. Device check showed multiple atrial arrhythmias with HR up to 200 per Dr. Queenie Olvera. Stress test negative on 2/3/2020. Has also been treated for hypomagnesemia. Rhythm has been AP-VS.     Subjective: He denies chest pain, palpitations, shortness of breath, and dizziness. Is seeking answers to each arrhythmia and where they came from. Vitals:  Blood pressure 124/80, pulse 62, temperature 97.7 °F (36.5 °C), temperature source Oral, resp. rate 18, height 6' (1.829 m), weight 236 lb (107 kg), SpO2 95 %.   Temp  Av °F (36.7 °C)  Min: 97.5 °F (36.4 °C)  Max: 98.6 °F (37 °C)  Pulse  Av.3  Min: 62  Max: 88  BP  Min: 107/63  Max: 152/83  SpO2  Av.4 %  Min: 95 %  Max: 96 %    24 hour I/O    Intake/Output Summary (Last 24 hours) at 2020 1149  Last data filed at 2020 1108  Gross per 24 hour   Intake 490 ml   Output 3095 ml   Net -2605 ml     Current Facility-Administered Medications   Medication Dose Route Frequency Provider Last Rate Last Dose    melatonin tablet 5 mg  5 mg Oral Nightly PRN NIXON Mtz NP   5 mg at 20 0323    metoprolol succinate (TOPROL XL) extended release tablet 50 mg  50 mg Oral BID Tracy Somers MD   50 mg at 20 0835    apixaban (ELIQUIS) tablet 5 mg  5 mg Oral BID Regan Preston MD   5 mg at 20 0836    aspirin EC tablet 81 mg  81 mg Oral Daily Regan Preston MD   81 mg at 20 0835    DULoxetine (CYMBALTA) extended release capsule 90 mg  90 mg Oral Daily Regan Preston MD   90 mg at 20 0835    levothyroxine (SYNTHROID) tablet 100 mcg  100 mcg Oral Daily Regan Preston MD   100 mcg at pulmonic regurgitation.  -The aortic root is at the upper limits of normal.  -Pacer / ICD wire is visualized in the right heart.  -No pericardial effusion noted. Stress test 2/3/2020:  Normal LV function.   Darby Bellis is normal isotope uptake at stress and rest. There is no evidence of    myocardial ischemia or scar.    Normal myocardial perfusion study. All labs and testing reviewed. Lab Review     Renal Profile:   Lab Results   Component Value Date    CREATININE 1.0 02/03/2020    BUN 11 02/03/2020     02/03/2020    K 4.4 02/03/2020    K 4.3 02/02/2020     02/03/2020    CO2 27 02/03/2020     CBC:    Lab Results   Component Value Date    WBC 7.5 02/02/2020    RBC 4.16 02/02/2020    HGB 13.4 02/02/2020    HCT 39.4 02/02/2020    MCV 94.8 02/02/2020    RDW 12.7 02/02/2020     02/04/2020     BNP:  No results found for: BNP  Fasting Lipid Panel:    Lab Results   Component Value Date    CHOL 146 02/02/2020    HDL 48 02/02/2020    TRIG 174 02/02/2020     Cardiac Enzymes:  CK/MbTroponin  Lab Results   Component Value Date    TROPONINI <0.01 02/02/2020     PT/ INR   Lab Results   Component Value Date    INR 2.34 05/01/2019    INR 3.36 04/30/2019    INR 3.13 04/29/2019    PROTIME 26.7 05/01/2019    PROTIME 38.3 04/30/2019    PROTIME 35.7 04/29/2019     PTT No results found for: PTT   Lab Results   Component Value Date    MG 1.90 02/03/2020    No results found for: TSH    Assessment:  1. Symptomatic paroxysmal atrial arrhythmias (AF/AFL/AT): recurrent 1/2020   -s/p RFCA x4   -[id number] score 3 (age, DM, HTN)  2. Sinus node dysfunction: stable   -s/p dual chamber pacemaker implant 2015 ([employer])  3. HTN: controlled  4. Chest pain: resolved   -noted in the setting of RVR   -stress test negative 2/3/2020  5. Hypomagnesemia: noted on admission, resolved  6. Hypothyroidism: on Synthroid    Plan:   1. Continue Eliquis and Toprol (increased this admission)   2. No cardiac indication for ASA   3.  Would not restart amlodipine on discharge to allow for higher dose beta blocker    EP will sign off but remains available if needed. Patient to follow up with Dr. Jorge A Vasquez after discharge.      NIXON Dougherty-CNP  [facility]  [phone]

## 2020-02-04 NOTE — PROGRESS NOTES
methocarbamol, sodium chloride flush, magnesium hydroxide, ondansetron, glucose, dextrose, glucagon (rDNA), dextrose, acetaminophen, nitroGLYCERIN, morphine, heparin (porcine), heparin (porcine)      Intake/Output Summary (Last 24 hours) at 2/4/2020 0707  Last data filed at 2/4/2020 0225  Gross per 24 hour   Intake 240 ml   Output 3170 ml   Net -2930 ml       Physical Exam Performed:    /85   Pulse 76   Temp 98.4 °F (36.9 °C) (Oral)   Resp 14   Ht 6' (1.829 m)   Wt 236 lb (107 kg)   SpO2 95%   BMI 32.01 kg/m²     General appearance: No apparent distress, appears stated age and cooperative. HEENT: Pupils equal, round, and reactive to light. Conjunctivae/corneas clear. Neck: Supple, with full range of motion. No jugular venous distention. Trachea midline. Respiratory:  Normal respiratory effort. Clear to auscultation, bilaterally without Rales/Wheezes/Rhonchi. Cardiovascular: Regular rate and rhythm with normal S1/S2 without murmurs, rubs or gallops. Abdomen: Soft, non-tender, non-distended with normal bowel sounds. Musculoskeletal: No clubbing, cyanosis or edema bilaterally. Full range of motion without deformity. Skin: Skin color, texture, turgor normal.  No rashes or lesions. Neurologic:  Neurovascularly intact without any focal sensory/motor deficits.  Cranial nerves: II-XII intact, grossly non-focal.  Psychiatric: Alert and oriented, thought content appropriate, normal insight  Capillary Refill: Brisk,< 3 seconds   Peripheral Pulses: +2 palpable, equal bilaterally       Labs:   Recent Labs     02/02/20  0329 02/02/20  1456   WBC 7.3 7.5   HGB 14.0 13.4*   HCT 40.7 39.4*    167     Recent Labs     02/02/20  0329 02/02/20  0923 02/03/20  0515    137 138   K 4.1 4.3 4.4    101 102   CO2 25 25 27   BUN 13 13 11   CREATININE 1.2 0.9 1.0   CALCIUM 9.2 8.6 8.8     Recent Labs     02/02/20  0329   AST 20   ALT 19   BILITOT <0.2   ALKPHOS 74     No results for input(s): INR in the last 72 hours. Recent Labs     02/02/20  0329 02/02/20  0759 02/02/20  1214   TROPONINI <0.01 <0.01 <0.01       Urinalysis:      Lab Results   Component Value Date    NITRU Negative 02/02/2020    BLOODU Negative 02/02/2020    SPECGRAV <=1.005 02/02/2020    GLUCOSEU Negative 02/02/2020       Radiology:  NM Cardiac Stress Test Nuclear Imaging   Final Result      XR CHEST PORTABLE   Final Result   No acute findings in the chest.                 Assessment/Plan:    Active Hospital Problems    Diagnosis    Cardiac arrhythmia [I49.9]    Hypomagnesemia [E83.42]    SVT (supraventricular tachycardia) (HCC) [I47.1]    Chest pain [R07.9]    S/P placement of cardiac pacemaker [Z95.0]    Chronic pain syndrome [G89.4]    Benign essential HTN [I10]     Cardiac arrhythmia present on arrival- Known previous history. No controlled   - SVT in the ED. Atrial fibrillation/flutter/tachycardia  - currently in sinus rhythm  - Pacemaker interrogated   - Cardiology/EP consulted- rec's reviewed    - Cardiology increased Toprol XL to 50 mg daily. apixaban  - Follow up with Dr. Jeffory Severin      Hypomagnesemia  - received Magnesium 2 g IV   - 1.9 today, goal >2     Chest Pain suspect secondary to arrhythmia, now resolved    -Serial troponin negative  - No ischemia on EKG  - Continue ASA, statin and beta blocker  - stress w/o ischemia      S/P placement of cardiac pacemaker  - Interrogation in ED showed normal function   - 24 episodes of SVT, 12 episodes of atrial fibrillation/atrial flutter, 8 episodes of NSVT, and ventricular rates in the 200's at times  - EP consulted as above      Diabetes, HgbA1C 7.0  - Hold home Metformin  - Low dose SSI     Chronic Pain  - Continue home dose of Morphine IR and Lyrica      Hypertension  - Controlled  - Toprol increased to 50 mg daily     Hyperlipidemia  - lipid panel reviewed  - Continue statin      Obesity  - With Body mass index is 28.5 kg/m². Complicating assessment and treatment.  Placing patient at

## 2020-02-04 NOTE — PLAN OF CARE
Patient will remain free from falls this shift. Call light within reach, bedside table within reach, bed in lowest position, bed alarm on, 2/4 rails raised, bed in locked position, phone within patient's reach, non-skid socks on, fall risk wristband applied. Patient instructed to call out if he needs to get up or needs any assistance, RN instructed patient not to ambulate without assistance. Patient verbalized understanding and calls out appropriately. RN will continue to monitor.

## 2020-02-05 NOTE — DISCHARGE SUMMARY
bilaterally. Full range of motion without deformity. Skin: Skin color, texture, turgor normal.  No rashes or lesions. Neurologic:  Neurovascularly intact without any focal sensory/motor deficits. Cranial nerves: II-XII intact, grossly non-focal.  Psychiatric:  Alert and oriented, thought content appropriate, normal insight  Capillary Refill: Brisk,< 3 seconds   Peripheral Pulses: +2 palpable, equal bilaterally       Labs:  For convenience and continuity at follow-up the following most recent labs are provided:      CBC:    Lab Results   Component Value Date    WBC 7.5 02/02/2020    HGB 13.4 02/02/2020    HCT 39.4 02/02/2020     02/04/2020       Renal:    Lab Results   Component Value Date     02/03/2020    K 4.4 02/03/2020    K 4.3 02/02/2020     02/03/2020    CO2 27 02/03/2020    BUN 11 02/03/2020    CREATININE 1.0 02/03/2020    CALCIUM 8.8 02/03/2020         Significant Diagnostic Studies    Radiology:   NM Cardiac Stress Test Nuclear Imaging   Final Result      XR CHEST PORTABLE   Final Result   No acute findings in the chest.                Consults:     IP CONSULT TO HOSPITALIST  IP CONSULT TO CARDIOLOGY  IP CONSULT TO RESPIRATORY CARE  IP CONSULT TO CARDIOLOGY    Disposition:  Home     Condition at Discharge: Stable    Discharge Instructions/Follow-up:  Cardiology     Code Status:  Prior     Activity: activity as tolerated    Diet: regular diet      Discharge Medications:     Discharge Medication List as of 2/4/2020  4:18 PM           Details   metoprolol succinate (TOPROL XL) 50 MG extended release tablet Take 1 tablet by mouth 2 times daily, Disp-60 tablet, R-5Print      b complex vitamins capsule Take 1 capsule by mouth daily, Disp-30 capsule, R-3Normal              Details   levothyroxine (SYNTHROID) 100 MCG tablet Take 100 mcg by mouth DailyHistorical Med      Apixaban (ELIQUIS PO) Take 5 mg by mouth 2 times daily Historical Med      Cholecalciferol (VITAMIN D-3) 25 MCG (1000 UT) CAPS contact me at [phone].

## 2020-02-07 ENCOUNTER — HOSPITAL ENCOUNTER (INPATIENT)
Age: 75
LOS: 3 days | Discharge: HOME | DRG: 310 | End: 2020-02-10
Attending: EMERGENCY MEDICINE | Admitting: INTERNAL MEDICINE
Payer: OTHER GOVERNMENT

## 2020-02-07 ENCOUNTER — APPOINTMENT (OUTPATIENT)
Dept: GENERAL RADIOLOGY | Age: 75
DRG: 310 | End: 2020-02-07
Payer: OTHER GOVERNMENT

## 2020-02-07 PROBLEM — I48.91 ATRIAL FIBRILLATION WITH RVR (HCC): Status: ACTIVE | Noted: 2020-02-07

## 2020-02-07 LAB
A/G RATIO: 1.6 (ref 1.1–2.2)
ALBUMIN SERPL-MCNC: 3.8 G/DL (ref 3.4–5)
ALP BLD-CCNC: 67 U/L (ref 40–129)
ALT SERPL-CCNC: 21 U/L (ref 10–40)
ANION GAP SERPL CALCULATED.3IONS-SCNC: 12 MMOL/L (ref 3–16)
AST SERPL-CCNC: 20 U/L (ref 15–37)
BASOPHILS ABSOLUTE: 0.1 K/UL (ref 0–0.2)
BASOPHILS RELATIVE PERCENT: 1.3 %
BILIRUB SERPL-MCNC: 0.3 MG/DL (ref 0–1)
BUN BLDV-MCNC: 15 MG/DL (ref 7–20)
CALCIUM SERPL-MCNC: 8.8 MG/DL (ref 8.3–10.6)
CHLORIDE BLD-SCNC: 101 MMOL/L (ref 99–110)
CO2: 21 MMOL/L (ref 21–32)
CREAT SERPL-MCNC: 1.1 MG/DL (ref 0.8–1.3)
EOSINOPHILS ABSOLUTE: 0.4 K/UL (ref 0–0.6)
EOSINOPHILS RELATIVE PERCENT: 4.6 %
GFR AFRICAN AMERICAN: >60
GFR NON-AFRICAN AMERICAN: >60
GLOBULIN: 2.4 G/DL
GLUCOSE BLD-MCNC: 206 MG/DL (ref 70–99)
HCT VFR BLD CALC: 42 % (ref 40.5–52.5)
HEMOGLOBIN: 14.4 G/DL (ref 13.5–17.5)
LYMPHOCYTES ABSOLUTE: 3 K/UL (ref 1–5.1)
LYMPHOCYTES RELATIVE PERCENT: 34.2 %
MAGNESIUM: 1.6 MG/DL (ref 1.8–2.4)
MCH RBC QN AUTO: 32.8 PG (ref 26–34)
MCHC RBC AUTO-ENTMCNC: 34.3 G/DL (ref 31–36)
MCV RBC AUTO: 95.4 FL (ref 80–100)
MONOCYTES ABSOLUTE: 0.9 K/UL (ref 0–1.3)
MONOCYTES RELATIVE PERCENT: 10.5 %
NEUTROPHILS ABSOLUTE: 4.3 K/UL (ref 1.7–7.7)
NEUTROPHILS RELATIVE PERCENT: 49.4 %
PDW BLD-RTO: 13.1 % (ref 12.4–15.4)
PLATELET # BLD: 176 K/UL (ref 135–450)
PMV BLD AUTO: 8.9 FL (ref 5–10.5)
POTASSIUM SERPL-SCNC: 3.8 MMOL/L (ref 3.5–5.1)
RBC # BLD: 4.41 M/UL (ref 4.2–5.9)
SODIUM BLD-SCNC: 134 MMOL/L (ref 136–145)
TOTAL PROTEIN: 6.2 G/DL (ref 6.4–8.2)
TROPONIN: <0.01 NG/ML
WBC # BLD: 8.7 K/UL (ref 4–11)

## 2020-02-07 PROCEDURE — 2580000003 HC RX 258

## 2020-02-07 PROCEDURE — 6360000002 HC RX W HCPCS: Performed by: EMERGENCY MEDICINE

## 2020-02-07 PROCEDURE — 96365 THER/PROPH/DIAG IV INF INIT: CPT

## 2020-02-07 PROCEDURE — 99285 EMERGENCY DEPT VISIT HI MDM: CPT

## 2020-02-07 PROCEDURE — 93005 ELECTROCARDIOGRAM TRACING: CPT | Performed by: EMERGENCY MEDICINE

## 2020-02-07 PROCEDURE — 84484 ASSAY OF TROPONIN QUANT: CPT

## 2020-02-07 PROCEDURE — 2580000003 HC RX 258: Performed by: EMERGENCY MEDICINE

## 2020-02-07 PROCEDURE — 96376 TX/PRO/DX INJ SAME DRUG ADON: CPT

## 2020-02-07 PROCEDURE — 2500000003 HC RX 250 WO HCPCS: Performed by: EMERGENCY MEDICINE

## 2020-02-07 PROCEDURE — 85025 COMPLETE CBC W/AUTO DIFF WBC: CPT

## 2020-02-07 PROCEDURE — 96367 TX/PROPH/DG ADDL SEQ IV INF: CPT

## 2020-02-07 PROCEDURE — 1200000000 HC SEMI PRIVATE

## 2020-02-07 PROCEDURE — 71046 X-RAY EXAM CHEST 2 VIEWS: CPT

## 2020-02-07 PROCEDURE — 96375 TX/PRO/DX INJ NEW DRUG ADDON: CPT

## 2020-02-07 PROCEDURE — 83735 ASSAY OF MAGNESIUM: CPT

## 2020-02-07 PROCEDURE — 80053 COMPREHEN METABOLIC PANEL: CPT

## 2020-02-07 PROCEDURE — 92960 CARDIOVERSION ELECTRIC EXT: CPT

## 2020-02-07 PROCEDURE — 2500000003 HC RX 250 WO HCPCS

## 2020-02-07 RX ORDER — ETOMIDATE 2 MG/ML
10 INJECTION INTRAVENOUS ONCE
Status: COMPLETED | OUTPATIENT
Start: 2020-02-07 | End: 2020-02-07

## 2020-02-07 RX ORDER — SODIUM CHLORIDE 9 MG/ML
INJECTION, SOLUTION INTRAVENOUS
Status: COMPLETED
Start: 2020-02-07 | End: 2020-02-07

## 2020-02-07 RX ORDER — MAGNESIUM SULFATE IN WATER 40 MG/ML
2 INJECTION, SOLUTION INTRAVENOUS ONCE
Status: COMPLETED | OUTPATIENT
Start: 2020-02-07 | End: 2020-02-08

## 2020-02-07 RX ORDER — DILTIAZEM HYDROCHLORIDE 5 MG/ML
INJECTION INTRAVENOUS
Status: COMPLETED
Start: 2020-02-07 | End: 2020-02-07

## 2020-02-07 RX ORDER — MAGNESIUM SULFATE HEPTAHYDRATE 500 MG/ML
2 INJECTION, SOLUTION INTRAMUSCULAR; INTRAVENOUS ONCE
Status: DISCONTINUED | OUTPATIENT
Start: 2020-02-07 | End: 2020-02-07

## 2020-02-07 RX ORDER — FENTANYL CITRATE 50 UG/ML
50 INJECTION, SOLUTION INTRAMUSCULAR; INTRAVENOUS ONCE
Status: COMPLETED | OUTPATIENT
Start: 2020-02-07 | End: 2020-02-07

## 2020-02-07 RX ORDER — MORPHINE SULFATE 4 MG/ML
4 INJECTION, SOLUTION INTRAMUSCULAR; INTRAVENOUS ONCE
Status: COMPLETED | OUTPATIENT
Start: 2020-02-08 | End: 2020-02-08

## 2020-02-07 RX ORDER — DILTIAZEM HYDROCHLORIDE 5 MG/ML
20 INJECTION INTRAVENOUS ONCE
Status: COMPLETED | OUTPATIENT
Start: 2020-02-07 | End: 2020-02-07

## 2020-02-07 RX ADMIN — MAGNESIUM SULFATE HEPTAHYDRATE 2 G: 40 INJECTION, SOLUTION INTRAVENOUS at 23:09

## 2020-02-07 RX ADMIN — DILTIAZEM HYDROCHLORIDE 5 MG/HR: 5 INJECTION INTRAVENOUS at 23:45

## 2020-02-07 RX ADMIN — DILTIAZEM HYDROCHLORIDE 5 MG: 5 INJECTION INTRAVENOUS at 21:41

## 2020-02-07 RX ADMIN — FENTANYL CITRATE 50 MCG: 50 INJECTION, SOLUTION INTRAMUSCULAR; INTRAVENOUS at 21:57

## 2020-02-07 RX ADMIN — DILTIAZEM HYDROCHLORIDE 5 MG/HR: 5 INJECTION INTRAVENOUS at 22:07

## 2020-02-07 RX ADMIN — ETOMIDATE 10 MG: 40 INJECTION, SOLUTION INTRAVENOUS at 21:30

## 2020-02-07 RX ADMIN — SODIUM CHLORIDE 1000 ML: 9 INJECTION, SOLUTION INTRAVENOUS at 22:07

## 2020-02-07 RX ADMIN — SODIUM CHLORIDE 1000 ML: 9 INJECTION, SOLUTION INTRAVENOUS at 21:57

## 2020-02-07 ASSESSMENT — PAIN SCALES - GENERAL
PAINLEVEL_OUTOF10: 8
PAINLEVEL_OUTOF10: 9
PAINLEVEL_OUTOF10: 10

## 2020-02-07 ASSESSMENT — PAIN DESCRIPTION - PAIN TYPE
TYPE: ACUTE PAIN
TYPE: ACUTE PAIN

## 2020-02-07 ASSESSMENT — PAIN DESCRIPTION - ORIENTATION: ORIENTATION: MID

## 2020-02-07 ASSESSMENT — PAIN DESCRIPTION - LOCATION: LOCATION: CHEST

## 2020-02-07 ASSESSMENT — PAIN DESCRIPTION - DESCRIPTORS: DESCRIPTORS: ACHING

## 2020-02-08 LAB
ANION GAP SERPL CALCULATED.3IONS-SCNC: 9 MMOL/L (ref 3–16)
BUN BLDV-MCNC: 13 MG/DL (ref 7–20)
CALCIUM SERPL-MCNC: 9 MG/DL (ref 8.3–10.6)
CHLORIDE BLD-SCNC: 105 MMOL/L (ref 99–110)
CO2: 26 MMOL/L (ref 21–32)
CREAT SERPL-MCNC: 1.1 MG/DL (ref 0.8–1.3)
EKG ATRIAL RATE: 106 BPM
EKG ATRIAL RATE: 120 BPM
EKG ATRIAL RATE: 163 BPM
EKG ATRIAL RATE: 99 BPM
EKG DIAGNOSIS: NORMAL
EKG P-R INTERVAL: 200 MS
EKG Q-T INTERVAL: 256 MS
EKG Q-T INTERVAL: 364 MS
EKG Q-T INTERVAL: 366 MS
EKG Q-T INTERVAL: 396 MS
EKG QRS DURATION: 86 MS
EKG QRS DURATION: 92 MS
EKG QRS DURATION: 94 MS
EKG QRS DURATION: 94 MS
EKG QTC CALCULATION (BAZETT): 421 MS
EKG QTC CALCULATION (BAZETT): 486 MS
EKG QTC CALCULATION (BAZETT): 495 MS
EKG QTC CALCULATION (BAZETT): 514 MS
EKG R AXIS: -34 DEGREES
EKG R AXIS: -52 DEGREES
EKG R AXIS: -53 DEGREES
EKG R AXIS: -64 DEGREES
EKG T AXIS: 111 DEGREES
EKG T AXIS: 67 DEGREES
EKG T AXIS: 72 DEGREES
EKG T AXIS: 92 DEGREES
EKG VENTRICULAR RATE: 106 BPM
EKG VENTRICULAR RATE: 120 BPM
EKG VENTRICULAR RATE: 163 BPM
EKG VENTRICULAR RATE: 94 BPM
GFR AFRICAN AMERICAN: >60
GFR NON-AFRICAN AMERICAN: >60
GLUCOSE BLD-MCNC: 169 MG/DL (ref 70–99)
GLUCOSE BLD-MCNC: 181 MG/DL (ref 70–99)
GLUCOSE BLD-MCNC: 182 MG/DL (ref 70–99)
GLUCOSE BLD-MCNC: 193 MG/DL (ref 70–99)
GLUCOSE BLD-MCNC: 221 MG/DL (ref 70–99)
INR BLD: 1.28 (ref 0.86–1.14)
PERFORMED ON: ABNORMAL
POTASSIUM SERPL-SCNC: 4.5 MMOL/L (ref 3.5–5.1)
PROTHROMBIN TIME: 14.9 SEC (ref 10–13.2)
SODIUM BLD-SCNC: 140 MMOL/L (ref 136–145)

## 2020-02-08 PROCEDURE — 83036 HEMOGLOBIN GLYCOSYLATED A1C: CPT

## 2020-02-08 PROCEDURE — 99223 1ST HOSP IP/OBS HIGH 75: CPT | Performed by: INTERNAL MEDICINE

## 2020-02-08 PROCEDURE — 2580000003 HC RX 258: Performed by: NURSE PRACTITIONER

## 2020-02-08 PROCEDURE — 93005 ELECTROCARDIOGRAM TRACING: CPT | Performed by: INTERNAL MEDICINE

## 2020-02-08 PROCEDURE — 6370000000 HC RX 637 (ALT 250 FOR IP): Performed by: INTERNAL MEDICINE

## 2020-02-08 PROCEDURE — 36415 COLL VENOUS BLD VENIPUNCTURE: CPT

## 2020-02-08 PROCEDURE — 93010 ELECTROCARDIOGRAM REPORT: CPT | Performed by: INTERNAL MEDICINE

## 2020-02-08 PROCEDURE — 6370000000 HC RX 637 (ALT 250 FOR IP): Performed by: NURSE PRACTITIONER

## 2020-02-08 PROCEDURE — 6360000002 HC RX W HCPCS: Performed by: EMERGENCY MEDICINE

## 2020-02-08 PROCEDURE — 85610 PROTHROMBIN TIME: CPT

## 2020-02-08 PROCEDURE — 1200000000 HC SEMI PRIVATE

## 2020-02-08 PROCEDURE — 80048 BASIC METABOLIC PNL TOTAL CA: CPT

## 2020-02-08 RX ORDER — DEXTROSE MONOHYDRATE 50 MG/ML
100 INJECTION, SOLUTION INTRAVENOUS PRN
Status: DISCONTINUED | OUTPATIENT
Start: 2020-02-08 | End: 2020-02-11 | Stop reason: HOSPADM

## 2020-02-08 RX ORDER — PRAMIPEXOLE DIHYDROCHLORIDE 0.25 MG/1
0.25 TABLET ORAL 3 TIMES DAILY
Status: DISCONTINUED | OUTPATIENT
Start: 2020-02-08 | End: 2020-02-08

## 2020-02-08 RX ORDER — 0.9 % SODIUM CHLORIDE 0.9 %
500 INTRAVENOUS SOLUTION INTRAVENOUS ONCE
Status: COMPLETED | OUTPATIENT
Start: 2020-02-08 | End: 2020-02-08

## 2020-02-08 RX ORDER — PRAVASTATIN SODIUM 80 MG/1
80 TABLET ORAL DAILY
Status: DISCONTINUED | OUTPATIENT
Start: 2020-02-08 | End: 2020-02-11 | Stop reason: HOSPADM

## 2020-02-08 RX ORDER — SODIUM CHLORIDE 0.9 % (FLUSH) 0.9 %
10 SYRINGE (ML) INJECTION EVERY 12 HOURS SCHEDULED
Status: DISCONTINUED | OUTPATIENT
Start: 2020-02-08 | End: 2020-02-11 | Stop reason: HOSPADM

## 2020-02-08 RX ORDER — SODIUM CHLORIDE 9 MG/ML
INJECTION, SOLUTION INTRAVENOUS CONTINUOUS
Status: DISCONTINUED | OUTPATIENT
Start: 2020-02-08 | End: 2020-02-08

## 2020-02-08 RX ORDER — PANTOPRAZOLE SODIUM 20 MG/1
20 TABLET, DELAYED RELEASE ORAL DAILY
Status: DISCONTINUED | OUTPATIENT
Start: 2020-02-08 | End: 2020-02-11 | Stop reason: HOSPADM

## 2020-02-08 RX ORDER — LEVOTHYROXINE SODIUM 0.1 MG/1
100 TABLET ORAL DAILY
Status: DISCONTINUED | OUTPATIENT
Start: 2020-02-08 | End: 2020-02-11 | Stop reason: HOSPADM

## 2020-02-08 RX ORDER — NICOTINE POLACRILEX 4 MG
15 LOZENGE BUCCAL PRN
Status: DISCONTINUED | OUTPATIENT
Start: 2020-02-08 | End: 2020-02-11 | Stop reason: HOSPADM

## 2020-02-08 RX ORDER — MORPHINE SULFATE 15 MG/1
15 TABLET ORAL EVERY 4 HOURS PRN
Status: DISCONTINUED | OUTPATIENT
Start: 2020-02-08 | End: 2020-02-11 | Stop reason: HOSPADM

## 2020-02-08 RX ORDER — ONDANSETRON 2 MG/ML
4 INJECTION INTRAMUSCULAR; INTRAVENOUS EVERY 6 HOURS PRN
Status: DISCONTINUED | OUTPATIENT
Start: 2020-02-08 | End: 2020-02-09

## 2020-02-08 RX ORDER — METHYLPHENIDATE HYDROCHLORIDE 10 MG/1
20 TABLET ORAL 2 TIMES DAILY
Status: DISCONTINUED | OUTPATIENT
Start: 2020-02-08 | End: 2020-02-11 | Stop reason: HOSPADM

## 2020-02-08 RX ORDER — DEXTROSE MONOHYDRATE 25 G/50ML
12.5 INJECTION, SOLUTION INTRAVENOUS PRN
Status: DISCONTINUED | OUTPATIENT
Start: 2020-02-08 | End: 2020-02-11 | Stop reason: HOSPADM

## 2020-02-08 RX ORDER — CHOLECALCIFEROL (VITAMIN D3) 125 MCG
5 CAPSULE ORAL NIGHTLY PRN
Status: DISCONTINUED | OUTPATIENT
Start: 2020-02-08 | End: 2020-02-11 | Stop reason: HOSPADM

## 2020-02-08 RX ORDER — MORPHINE SULFATE 15 MG/1
15 TABLET ORAL 4 TIMES DAILY PRN
Status: DISCONTINUED | OUTPATIENT
Start: 2020-02-08 | End: 2020-02-08

## 2020-02-08 RX ORDER — AMIODARONE HYDROCHLORIDE 400 MG/1
400 TABLET ORAL 2 TIMES DAILY
Qty: 30 TABLET | Refills: 0 | Status: SHIPPED | OUTPATIENT
Start: 2020-02-08 | End: 2020-02-10 | Stop reason: SDUPTHER

## 2020-02-08 RX ORDER — ASPIRIN 81 MG/1
81 TABLET ORAL DAILY
Status: DISCONTINUED | OUTPATIENT
Start: 2020-02-08 | End: 2020-02-11 | Stop reason: HOSPADM

## 2020-02-08 RX ORDER — SODIUM CHLORIDE 0.9 % (FLUSH) 0.9 %
10 SYRINGE (ML) INJECTION PRN
Status: DISCONTINUED | OUTPATIENT
Start: 2020-02-08 | End: 2020-02-11 | Stop reason: HOSPADM

## 2020-02-08 RX ORDER — AMIODARONE HYDROCHLORIDE 200 MG/1
400 TABLET ORAL 2 TIMES DAILY
Status: DISCONTINUED | OUTPATIENT
Start: 2020-02-08 | End: 2020-02-11 | Stop reason: HOSPADM

## 2020-02-08 RX ORDER — PRAMIPEXOLE DIHYDROCHLORIDE 0.25 MG/1
0.25 TABLET ORAL 3 TIMES DAILY
Status: DISCONTINUED | OUTPATIENT
Start: 2020-02-08 | End: 2020-02-11 | Stop reason: HOSPADM

## 2020-02-08 RX ORDER — METOPROLOL SUCCINATE 50 MG/1
50 TABLET, EXTENDED RELEASE ORAL 2 TIMES DAILY
Status: DISCONTINUED | OUTPATIENT
Start: 2020-02-08 | End: 2020-02-11 | Stop reason: HOSPADM

## 2020-02-08 RX ADMIN — SODIUM CHLORIDE: 9 INJECTION, SOLUTION INTRAVENOUS at 03:22

## 2020-02-08 RX ADMIN — PRAVASTATIN SODIUM 80 MG: 80 TABLET ORAL at 08:45

## 2020-02-08 RX ADMIN — ASPIRIN 81 MG: 81 TABLET, COATED ORAL at 08:46

## 2020-02-08 RX ADMIN — Medication 10 ML: at 21:17

## 2020-02-08 RX ADMIN — AMIODARONE HYDROCHLORIDE 400 MG: 200 TABLET ORAL at 21:11

## 2020-02-08 RX ADMIN — DULOXETINE HYDROCHLORIDE 90 MG: 30 CAPSULE, DELAYED RELEASE ORAL at 08:45

## 2020-02-08 RX ADMIN — PRAMIPEXOLE DIHYDROCHLORIDE 0.25 MG: 0.25 TABLET ORAL at 13:25

## 2020-02-08 RX ADMIN — INSULIN LISPRO 3 UNITS: 100 INJECTION, SOLUTION INTRAVENOUS; SUBCUTANEOUS at 17:15

## 2020-02-08 RX ADMIN — APIXABAN 5 MG: 5 TABLET, FILM COATED ORAL at 08:46

## 2020-02-08 RX ADMIN — PRAMIPEXOLE DIHYDROCHLORIDE 0.25 MG: 0.25 TABLET ORAL at 01:23

## 2020-02-08 RX ADMIN — PRAMIPEXOLE DIHYDROCHLORIDE 0.25 MG: 0.25 TABLET ORAL at 21:03

## 2020-02-08 RX ADMIN — APIXABAN 5 MG: 5 TABLET, FILM COATED ORAL at 21:05

## 2020-02-08 RX ADMIN — INSULIN LISPRO 3 UNITS: 100 INJECTION, SOLUTION INTRAVENOUS; SUBCUTANEOUS at 01:24

## 2020-02-08 RX ADMIN — METHYLPHENIDATE HYDROCHLORIDE 20 MG: 10 TABLET ORAL at 16:45

## 2020-02-08 RX ADMIN — PREGABALIN 200 MG: 75 CAPSULE ORAL at 08:45

## 2020-02-08 RX ADMIN — APIXABAN 5 MG: 5 TABLET, FILM COATED ORAL at 01:23

## 2020-02-08 RX ADMIN — INSULIN LISPRO 3 UNITS: 100 INJECTION, SOLUTION INTRAVENOUS; SUBCUTANEOUS at 08:47

## 2020-02-08 RX ADMIN — INSULIN LISPRO 3 UNITS: 100 INJECTION, SOLUTION INTRAVENOUS; SUBCUTANEOUS at 12:04

## 2020-02-08 RX ADMIN — PANTOPRAZOLE SODIUM 20 MG: 20 TABLET, DELAYED RELEASE ORAL at 08:46

## 2020-02-08 RX ADMIN — PREGABALIN 200 MG: 75 CAPSULE ORAL at 01:23

## 2020-02-08 RX ADMIN — INSULIN LISPRO 3 UNITS: 100 INJECTION, SOLUTION INTRAVENOUS; SUBCUTANEOUS at 21:06

## 2020-02-08 RX ADMIN — PREGABALIN 200 MG: 75 CAPSULE ORAL at 21:11

## 2020-02-08 RX ADMIN — MORPHINE SULFATE 15 MG: 15 TABLET ORAL at 16:29

## 2020-02-08 RX ADMIN — MORPHINE SULFATE 4 MG: 4 INJECTION, SOLUTION INTRAMUSCULAR; INTRAVENOUS at 00:06

## 2020-02-08 RX ADMIN — METOPROLOL SUCCINATE 50 MG: 50 TABLET, EXTENDED RELEASE ORAL at 08:45

## 2020-02-08 RX ADMIN — AMIODARONE HYDROCHLORIDE 400 MG: 200 TABLET ORAL at 10:11

## 2020-02-08 RX ADMIN — SODIUM CHLORIDE 500 ML: 9 INJECTION, SOLUTION INTRAVENOUS at 01:27

## 2020-02-08 RX ADMIN — MORPHINE SULFATE 15 MG: 15 TABLET ORAL at 06:06

## 2020-02-08 RX ADMIN — MELATONIN TAB 5 MG 5 MG: 5 TAB at 01:23

## 2020-02-08 RX ADMIN — LEVOTHYROXINE SODIUM 100 MCG: 100 TABLET ORAL at 05:50

## 2020-02-08 RX ADMIN — MORPHINE SULFATE 15 MG: 15 TABLET ORAL at 10:11

## 2020-02-08 RX ADMIN — METHYLPHENIDATE HYDROCHLORIDE 20 MG: 10 TABLET ORAL at 08:45

## 2020-02-08 RX ADMIN — METOPROLOL SUCCINATE 50 MG: 50 TABLET, EXTENDED RELEASE ORAL at 21:11

## 2020-02-08 RX ADMIN — MORPHINE SULFATE 15 MG: 15 TABLET ORAL at 01:22

## 2020-02-08 RX ADMIN — MORPHINE SULFATE 15 MG: 15 TABLET ORAL at 21:05

## 2020-02-08 RX ADMIN — PRAMIPEXOLE DIHYDROCHLORIDE 0.25 MG: 0.25 TABLET ORAL at 08:46

## 2020-02-08 ASSESSMENT — PAIN SCALES - GENERAL
PAINLEVEL_OUTOF10: 8
PAINLEVEL_OUTOF10: 7
PAINLEVEL_OUTOF10: 5
PAINLEVEL_OUTOF10: 5
PAINLEVEL_OUTOF10: 7
PAINLEVEL_OUTOF10: 3
PAINLEVEL_OUTOF10: 9

## 2020-02-08 ASSESSMENT — PAIN DESCRIPTION - LOCATION
LOCATION_2: BACK
LOCATION: CHEST

## 2020-02-08 ASSESSMENT — PAIN DESCRIPTION - INTENSITY: RATING_2: 7

## 2020-02-08 ASSESSMENT — PAIN DESCRIPTION - FREQUENCY: FREQUENCY: CONTINUOUS

## 2020-02-08 ASSESSMENT — PAIN DESCRIPTION - PROGRESSION: CLINICAL_PROGRESSION: NOT CHANGED

## 2020-02-08 ASSESSMENT — PAIN DESCRIPTION - ONSET: ONSET: ON-GOING

## 2020-02-08 ASSESSMENT — PAIN DESCRIPTION - PAIN TYPE
TYPE_2: CHRONIC PAIN
TYPE: ACUTE PAIN

## 2020-02-08 ASSESSMENT — PAIN DESCRIPTION - ORIENTATION: ORIENTATION_2: LOWER

## 2020-02-08 NOTE — CARE COORDINATION
CASE MANAGEMENT INITIAL ASSESSMENT      Reviewed chart and met with patient today, re: triggered by age,dx, and recent admission  Explained Case Management role/services. yes    Family present: no  Primary contact information: Trinidad Toscano [phone] wife    Admit date/status: 2/7/2020  Diagnosis: AFIB  Is this a Readmission?:   Yes. Returned for same Chest pain and SOB symptoms. Insurance: [facility]/medicare  Precert required for SNF - N        3 night stay required - Y    Living arrangements, Adls, care needs, prior to admission: lives with wife in a multi level home. IPTA. Still drives. Uses Cane    Transportation: family     Durable Medical Equipment at home: Walker_x_Cane_x_RTS__ BSC__Shower Chair__  02__ HHN__ CPAP__  BiPap__  Hospital Bed__ W/C__x_ Other__________    Services in the home and/or outpatient, prior to admission: none    PT/OT recs: not ordered     Hospital Exemption Notification (HEN): needed for     Barriers to discharge: none    Plan/comments: plans to return to home with no needs. Would benefit [facility] Med[facility] to [facility].      ECOC on chart for MD signature na

## 2020-02-08 NOTE — FLOWSHEET NOTE
Cross cover page: Updated pt medication list and pt is taking melatonin 6mg at night and also takes morphine 15mg Q4PRN and is requesting them. Also can we change the time on his Mirapex to start tonight? He states that he has not had his nightly dose of this. Please advise. Thanks.

## 2020-02-08 NOTE — ED PROVIDER NOTES
Emergency Physician Note    Chief Complaint  Chest Pain (chest pain and shortness of breath starting about 9pm; Rapid HR in the 200s per squad 0.4 SL nitro and 324 ASA given in route. )       History of Present Illness  Akilah Elizabeth is a 69 y.o. male who presents to the ED for chest pain or shortness of breath. Patient reports the onset of chest pain shortness of breath at around 9 PM.  He also felt like his heart was beating fast.  He called EMS. EMS gave the patient aspirin and nitroglycerin in route. He also measured a blood pressure of 150/100. They were concerned for MI. Patient states he feels very weak and his chest still hurts and he is short of breath. He reports similar symptoms with previous episode of rapid heartbeat. 10 systems reviewed, pertinent positives per HPI otherwise noted to be negative    I have reviewed the following from the nursing documentation:      Prior to Admission medications    Medication Sig Start Date End Date Taking? Authorizing Provider   metoprolol succinate (TOPROL XL) 50 MG extended release tablet Take 1 tablet by mouth 2 times daily 2/4/20   Annella Brood, APRN - CNP   b complex vitamins capsule Take 1 capsule by mouth daily 2/4/20   Anitha Dense, APRN - CNP   levothyroxine (SYNTHROID) 100 MCG tablet Take 100 mcg by mouth Daily    Historical Provider, MD   Apixaban (ELIQUIS PO) Take 5 mg by mouth 2 times daily     Historical Provider, MD   Cholecalciferol (VITAMIN D-3) 25 MCG (1000 UT) CAPS Take by mouth    Historical Provider, MD   methocarbamol (ROBAXIN) 500 MG tablet Take 500 mg by mouth 3 times daily as needed    Historical Provider, MD   Morphine Sulfate ER 15 MG T12A Take 15 mg by mouth every 4 hours as needed. Maximum of 41/2 doses daily. Historical Provider, MD   nicotine (NICODERM CQ) 14 MG/24HR Place 1 patch onto the skin every 24 hours    Historical Provider, MD   methylphenidate (RITALIN) 10 MG tablet Take 20 mg by mouth 2 times daily. Historical Provider, MD   testosterone (ANDROGEL) 25 MG/2.5GM (1%) GEL 1 % gel Apply 5 g topically daily. 2 pumps once a day. Historical Provider, MD   docusate sodium (COLACE) 100 MG capsule Take 100 mg by mouth 2 times daily    Historical Provider, MD   pregabalin (LYRICA) 100 MG capsule Take 2 capsules by mouth 2 times daily for 30 days. . 7/19/18 8/18/18  Jessica Reyna MD   pramipexole (MIRAPEX) 0.25 MG tablet Take 0.25 mg by mouth 3 times daily Take 1 at 5pm, and 9 at bedtime as needed and one later at night as needed.   May take 1 tablet prn for breakthrough symptoms of restless leg syndrome    Historical Provider, MD   DULoxetine (CYMBALTA) 60 MG extended release capsule Take 90 mg by mouth daily     Historical Provider, MD   aspirin 81 MG EC tablet Take 81 mg by mouth daily    Historical Provider, MD   pantoprazole (PROTONIX) 20 MG tablet Take 20 mg by mouth daily    Historical Provider, MD   metFORMIN (GLUCOPHAGE) 500 MG tablet Take 500 mg by mouth 2 times daily (with meals)    Historical Provider, MD   pravastatin (PRAVACHOL) 80 MG tablet Take 80 mg by mouth daily    Historical Provider, MD   melatonin 3 MG TABS tablet Take 6 mg by mouth nightly May take 1 additional tablet in 1 hour if no effect    Historical Provider, MD       Allergies as of 02/07/2020 - Review Complete 02/07/2020   Allergen Reaction Noted    Requip [ropinirole hcl]  02/02/2020    Zolpidem         Past Medical History:   Diagnosis Date    Arthritis     Atrial fibrillation [facility] [facility]     Cardiac pacemaker     Chronic back pain     Diabetes mellitus ([facility] [address] [facility]     History of blood transfusion     Hyperlipidemia     Hypertension     Psychiatric problem     PTSD    Thyroid disease         Surgical History:   Past Surgical History:   Procedure Laterality Date    COLONOSCOPY      ENDOSCOPY, COLON, DIAGNOSTIC      EYE SURGERY      Left eye cornia and cataracts    FRACTURE SURGERY      Left hand    PACEMAKER PLACEMENT      SKIN BIOPSY      Right forearm        Family History:    Family History   Problem Relation Age of Onset    Stroke Mother     Heart Attack Father     Heart Attack Brother        Social History     Socioeconomic History    Marital status:      Spouse name: Not on file    Number of children: Not on file    Years of education: Not on file    Highest education level: Not on file   Occupational History    Not on file   Social Needs    Financial resource strain: Not on file    Food insecurity:     Worry: Not on file     Inability: Not on file    Transportation needs:     Medical: Not on file     Non-medical: Not on file   Tobacco Use    Smoking status: Former Smoker    Smokeless tobacco: Never Used    Tobacco comment: nicotine patch    Substance and Sexual Activity    Alcohol use: Yes     Comment: occasional    Drug use: No    Sexual activity: Not on file   Lifestyle    Physical activity:     Days per week: Not on file     Minutes per session: Not on file    Stress: Not on file   Relationships    Social connections:     Talks on phone: Not on file     Gets together: Not on file     Attends [employer] service: Not on file     Active member of club or organization: Not on file     Attends meetings of clubs or organizations: Not on file     Relationship status: Not on file    Intimate partner violence:     Fear of current or ex partner: Not on file     Emotionally abused: Not on file     Physically abused: Not on file     Forced sexual activity: Not on file   Other Topics Concern    Not on file   Social History Narrative    Not on file       Nursing notes reviewed.     ED Triage Vitals   Enc Vitals Group      BP 02/07/20 2129 80/66      Pulse 02/07/20 2121 197      Resp 02/07/20 2121 22      Temp 02/07/20 2235 98 °F (36.7 °C)      Temp Source 02/07/20 2235 Oral      SpO2 02/07/20 2121 100 %      Weight --       Height --       Head Circumference --       Peak Flow --       Pain Score --       Pain Loc --       Pain Edu? --       Excl. in GC? --        GENERAL:  Awake, alert. Well developed, well nourished with mild distress. Pale and speaking quietly. HENT:  Normocephalic, Atraumatic, moist mucous membranes. EYES:  Pupils equal round and reactive to light, Conjunctiva normal, extraocular movements normal.  NECK:  No meningeal signs, Supple. CHEST: Extremely tachycardic, chest wall non-tender. LUNGS:  Clear to auscultation bilaterally. ABDOMEN:  Soft, non-tender, no rebound, rigidity or guarding, non-distended, normal bowel sounds. No costovertebral angle tenderness to palpation. BACK:  No tenderness. EXTREMITIES:  Normal range of motion, no edema, no bony tenderness, no deformity, distal pulses present. SKIN: Warm, dry and intact. NEUROLOGIC: Normal mental status. Moving all extremities to command. LABS and DIAGNOSTIC RESULTS  EKG  The Ekg interpreted by me shows  atrial fibrillation with a rate of 163  Axis is   Left axis deviation  QTc is  normal  ST Segments: No ST elevation  A. fib has replaced sinus rhythm and the rate has significantly changed from prior EKG dated 2 feb 2020    REPEAT EKG @2213  The Ekg interpreted by me shows  intervals of sinus and a fib with a rate of 120  Axis is   Left axis deviation  QTc is  514ms  Intervals and Durations are unremarkable. ST Segments: no ST elevation  No significant change from prior EKG dated earlier today    REPEAT [email or web address]  The Ekg interpreted by me shows  normal pacemaker rhythm with a rate of 106  Axis is   Left axis deviation  QTc is  normal  ST Segments: no acute change  No significant change from prior EKG dated 2 feb 2020    RADIOLOGY  X-RAYS:  I have reviewed radiologic plain film image(s). ALL OTHER NON-PLAIN FILM IMAGES SUCH AS CT, ULTRASOUND AND MRI HAVE BEEN READ BY THE RADIOLOGIST. XR CHEST STANDARD (2 VW)   Final Result   Stable portable study.               LABS  Labs Reviewed   COMPREHENSIVE METABOLIC PANEL - Abnormal;

## 2020-02-08 NOTE — PROGRESS NOTES
Patient admitted to room 208 from ED. Patient oriented to room, call light, bed rails, phone, lights and bathroom. Patient instructed about the schedule of the day including: vital sign frequency, lab draws, possible tests, frequency of MD and staff rounds, including RN/MD rounding together at bedside, daily weights, and I &O's. Patient instructed about prescribed diet, how to use 8MENU, and television. Telemetry box 34 in place, patient aware of placement and reason. Bed locked, in lowest position, side rails up 2/4, call light within reach. Will continue to monitor.

## 2020-02-08 NOTE — PROGRESS NOTES
methylphenidate  20 mg Oral BID    metoprolol succinate  50 mg Oral BID    pantoprazole  20 mg Oral Daily    pravastatin  80 mg Oral Daily    pregabalin  200 mg Oral BID    sodium chloride flush  10 mL Intravenous 2 times per day    insulin lispro  0-18 Units Subcutaneous TID     insulin lispro  0-9 Units Subcutaneous Nightly    pramipexole  0.25 mg Oral TID     PRN Meds: sodium chloride flush, magnesium hydroxide, ondansetron, glucose, dextrose, glucagon (rDNA), dextrose, melatonin, morphine      Intake/Output Summary (Last 24 hours) at 2/8/2020 0842  Last data filed at 2/8/2020 0708  Gross per 24 hour   Intake 720 ml   Output 925 ml   Net -205 ml       Physical Exam Performed:    BP (!) 152/94   Pulse 83   Temp 97.5 °F (36.4 °C) (Oral)   Resp 18   Ht 6' (1.829 m)   Wt 221 lb 9 oz (100.5 kg)   SpO2 98%   BMI 30.05 kg/m²     General appearance: No apparent distress, appears stated age and cooperative. Obese  HEENT: Pupils equal, round, and reactive to light. Conjunctivae/corneas clear. Neck: Supple, with full range of motion. No jugular venous distention. Trachea midline. Respiratory:  Normal respiratory effort. Clear to auscultation, bilaterally without Rales/Wheezes/Rhonchi. Cardiovascular: Regular rate and rhythm with normal S1/S2 without murmurs, rubs or gallops. Abdomen: Soft, non-tender, non-distended with normal bowel sounds. Musculoskeletal: No clubbing, cyanosis or edema bilaterally. Full range of motion without deformity. Skin: Skin color, texture, turgor normal.  No rashes or lesions. Neurologic:  Neurovascularly intact without any focal sensory/motor deficits.  Cranial nerves: II-XII intact, grossly non-focal.  Psychiatric: Alert and oriented, thought content appropriate, normal insight  Capillary Refill: Brisk,< 3 seconds   Peripheral Pulses: +2 palpable, equal bilaterally       Labs:   Recent Labs     02/07/20  2143   WBC 8.7   HGB 14.4   HCT 42.0        Recent Labs 02/07/20  2143 02/08/20  0730   * 140   K 3.8 4.5    105   CO2 21 26   BUN 15 13   CREATININE 1.1 1.1   CALCIUM 8.8 9.0     Recent Labs     02/07/20  2143   AST 20   ALT 21   BILITOT 0.3   ALKPHOS 67     Recent Labs     02/08/20  0730   INR 1.28*     Recent Labs     02/07/20  2143   TROPONINI <0.01       Urinalysis:      Lab Results   Component Value Date    NITRU Negative 02/02/2020    BLOODU Negative 02/02/2020    SPECGRAV <=1.005 02/02/2020    GLUCOSEU Negative 02/02/2020       Radiology:  XR CHEST STANDARD (2 VW)   Final Result   Stable portable study. Assessment/Plan:    Active Hospital Problems    Diagnosis    Atrial fibrillation with RVR ([address] [I48.91]    Pacemaker [Z95.0]    Atrial fibrillation, rapid ([address] [I48.91]    Benign essential HTN [I10]       Chest pain and shortness of breath in setting of atrial fibrillation with RVR in patient with h/o SVT  -EKG on admission showed atrial fibrillation with RVR rate 200. This morning Junctional rhythm with rate 94  -patient with permanent pacer  -patient cardioverted x 2 in ED  -multiple boluses of cardizem given in ED  -medtronic to interrogate pacemaker  -fentanyl given in ED  -Anticoagulated on Eliquis  -Continue beta-blocker  -Telemetry monitoring  -Cardiology consult-appreciate recommendations in advance  -Stopped IV fluid  -echo on 5/1/2019:    -Limited echo for dizziness s/p a-fib ablation.  -Left ventricular cavity size is normal. There is mild concentric left ventricular hypertrophy. Overall left ventricular systolic        function appears normal with a estimated ejection fraction of 60%.   -Mild mitral, tricuspid and pulmonic regurgitation.  -The aortic root is at the upper limits of normal.  -Pacer / ICD wire is visualized in the right heart.  -No pericardial effusion noted     Essential HTN  -Continue metoprolol     Hypomagnesemia, 1.6 on admission  -replaced in ED  -recheck mag in am     Hypothyroidism  -TSH 1.74 on 2/2/2020  -Continue levothyroxine     DM2, uncontrolled  - on admission  -hemglobin a1c 7.0 on 7/13/2018, repeat in am  -hold home metformin  -hdssi  -poct ac/hs  -hypoglycemia protocol  -carb control diet     Hyperlipidemia  -Continue pravastatin  DVT Prophylaxis: on Eliquis  Diet: DIET CARB CONTROL;  Code Status: Full Code    PT/OT Eval Status: not ordered    Dispo - 2 days    Kristina Jackson MD

## 2020-02-08 NOTE — ED NOTES
Pt started to feel numbness down left arm; felt like an elephant was sitting on his chest; Repeat EKG ordered;  notified      Marybeth Morris RN  02/07/20 0156

## 2020-02-08 NOTE — PROGRESS NOTES
Cardizem drip stopped, pt in NSR on telemetry. Ok per Dr. Kathryn Brooks with amiodarone and metoprolol given this AM. Will continue to monitor HR.

## 2020-02-08 NOTE — ED NOTES
[facility] at 2312  RE:rapid a fib per Dr. Burnadette Merlin spoke with Dr. Oswaldo Carrillo at [phone].      Fredo Mathis  02/07/20 6240

## 2020-02-08 NOTE — H&P
Hospital Medicine History & Physical      PCP: No primary care provider on file. Date of Admission: 2/7/2020    Date of Service: Pt seen/examined on 2/7/2020 and Admitted to Inpatient with expected LOS greater than two midnights due to medical therapy. Chief Complaint: Chest pain and shortness of breath    History Of Present Illness:      69 y.o. male, past medical history of atrial fibrillation, SVT, hypothyroidism, hypertension, hyperlipidemia and diabetes, who presented to [facility] with chest pain and shortness of breath. History obtained from the patient and review of EMR. The patient stated around 9 pm he began having left sided chest pain and shortness of breath. He stated he felt like his \"heart was beating out of his chest\" so he called EMS. Per EMR, enroute the patient was given ASA and nitroglycerin d/t concern for MI. When the patient arrived to the ED he was found to be in atrial fibrillation with RVR at 200 bmp. He was given multiple doses of IV cardizem, fentanyl, etomidate and cardioverted x 2. The patient does have a pacemaker and it was interrogated in the ED. The patient stated he was recently discharged from here at [facility] on 2/4 for episodes of rapid heart rate and was found to be in SVT at that time. He is anticoagulated on eliquis. The patient follows with Dr. Ivis Vargas from cardiology. Cardiology has been consulted. The patient will be admitted for further evaluation. He denied any other associated symptoms as well as any aggravating and/or alleviating factors. At the time of this assessment, the patient was resting comfortably in bed. He currently denies any chest pain, back pain, abdominal pain, shortness of breath, numbness, tingling, N/V/C/D, fever and/or chills.      Past Medical History:          Diagnosis Date    Arthritis     Atrial fibrillation [facility])     Cardiac pacemaker     Chronic back pain     Diabetes mellitus [address] [address]     History of blood transfusion     Hyperlipidemia     Hypertension     Psychiatric problem     PTSD    Thyroid disease      Past Surgical History:          Procedure Laterality Date    COLONOSCOPY      ENDOSCOPY, COLON, DIAGNOSTIC      EYE SURGERY      Left eye cornia and cataracts    FRACTURE SURGERY      Left hand    PACEMAKER PLACEMENT      SKIN BIOPSY      Right forearm     Medications Prior to Admission:      Prior to Admission medications    Medication Sig Start Date End Date Taking? Authorizing Provider   metoprolol succinate (TOPROL XL) 50 MG extended release tablet Take 1 tablet by mouth 2 times daily 2/4/20   NIXON Christy CNP   b complex vitamins capsule Take 1 capsule by mouth daily 2/4/20   NIXON Goldsmith CNP   levothyroxine (SYNTHROID) 100 MCG tablet Take 100 mcg by mouth Daily    Historical Provider, MD   Apixaban (ELIQUIS PO) Take 5 mg by mouth 2 times daily     Historical Provider, MD   Cholecalciferol (VITAMIN D-3) 25 MCG (1000 UT) CAPS Take by mouth    Historical Provider, MD   methocarbamol (ROBAXIN) 500 MG tablet Take 500 mg by mouth 3 times daily as needed    Historical Provider, MD   Morphine Sulfate ER 15 MG T12A Take 15 mg by mouth every 4 hours as needed. Maximum of 41/2 doses daily. Historical Provider, MD   nicotine (NICODERM CQ) 14 MG/24HR Place 1 patch onto the skin every 24 hours    Historical Provider, MD   methylphenidate (RITALIN) 10 MG tablet Take 20 mg by mouth 2 times daily. Historical Provider, MD   testosterone (ANDROGEL) 25 MG/2.5GM (1%) GEL 1 % gel Apply 5 g topically daily. 2 pumps once a day. Historical Provider, MD   docusate sodium (COLACE) 100 MG capsule Take 100 mg by mouth 2 times daily    Historical Provider, MD   pregabalin (LYRICA) 100 MG capsule Take 2 capsules by mouth 2 times daily for 30 days. . 7/19/18 8/18/18  Melvin Roberto MD   pramipexole (MIRAPEX) 0.25 MG tablet Take 0.25 mg by mouth 3 times daily Take 1 at 5pm, and 9 at bedtime as needed and one later at night as needed. May take 1 tablet prn for breakthrough symptoms of restless leg syndrome    Historical Provider, MD   DULoxetine (CYMBALTA) 60 MG extended release capsule Take 90 mg by mouth daily     Historical Provider, MD   aspirin 81 MG EC tablet Take 81 mg by mouth daily    Historical Provider, MD   pantoprazole (PROTONIX) 20 MG tablet Take 20 mg by mouth daily    Historical Provider, MD   metFORMIN (GLUCOPHAGE) 500 MG tablet Take 500 mg by mouth 2 times daily (with meals)    Historical Provider, MD   pravastatin (PRAVACHOL) 80 MG tablet Take 80 mg by mouth daily    Historical Provider, MD   melatonin 3 MG TABS tablet Take 6 mg by mouth nightly May take 1 additional tablet in 1 hour if no effect    Historical Provider, MD     Allergies:  Requip [ropinirole hcl] and Zolpidem    Social History:      The patient currently lives at home    TOBACCO:   reports that he has quit smoking. He has never used smokeless tobacco.  ETOH:   reports current alcohol use. E-Cigarettes Vaping or Juuling     Questions Responses    E-Cigarette Use     Start Date     Does device contain nicotine? Quit Date     E-Cigarette Type         Family History:      Reviewed in detail. Positive as follows:        Problem Relation Age of Onset    Stroke Mother     Heart Attack Father     Heart Attack Brother      REVIEW OF SYSTEMS:   Pertinent positives as noted in the HPI. All other systems reviewed and negative. PHYSICAL EXAM PERFORMED:    BP (!) 100/54   Pulse 101   Temp 98 °F (36.7 °C) (Oral)   Resp 16   SpO2 100%     General appearance: Pleasant male in no apparent distress, appears stated age and cooperative. HEENT: Pupils equal, round, and reactive to light. Extra ocular muscles intact. Conjunctivae/corneas clear. Neck: Supple, with full range of motion. No jugular venous distention. Trachea midline. Respiratory:  Normal respiratory effort.  Clear to auscultation, bilaterally without Rales/Wheezes/Rhonchi. Cardiovascular:  irregular rate and rhythm, atrial fibrillation with normal S1/S2 without murmurs, rubs or gallops. Abdomen: Soft, non-tender, non-distended with normal bowel sounds. Musculoskeletal:  No clubbing, cyanosis or edema bilaterally. Full range of motion without deformity. Skin: Skin color, texture, turgor normal.  No significant rashes or lesions. Neurologic:  Neurovascularly intact. Cranial nerves: II-XII intact, grossly non-focal.  Psychiatric:  Alert and oriented, thought content appropriate, normal insight  Capillary Refill: Brisk,< 3 seconds   Peripheral Pulses: +2 palpable, equal bilaterally     Labs:     Recent Labs     02/07/20 2143   WBC 8.7   HGB 14.4   HCT 42.0        Recent Labs     02/07/20 2143   *   K 3.8      CO2 21   BUN 15   CREATININE 1.1   CALCIUM 8.8     Recent Labs     02/07/20 2143   AST 20   ALT 21   BILITOT 0.3   ALKPHOS 67     Recent Labs     02/07/20 2143   TROPONINI <0.01     Urinalysis:      Lab Results   Component Value Date    NITRU Negative 02/02/2020    BLOODU Negative 02/02/2020    SPECGRAV <=1.005 02/02/2020    GLUCOSEU Negative 02/02/2020     Radiology:     CXR: I have reviewed the CXR with the following interpretation: No acute cardiopulmonary findings    EKG:  I have reviewed the EKG with the following interpretation: The Ekg interpreted in the absence of a cardiologist shows Demand pacemaker; interpretation is based on intrinsic rhythm. Atrial fibrillation with rapid ventricular response with premature ventricular or aberrantly conducted complexes. Incomplete right bundle branch block. Left anterior fascicular block. Minimal voltage criteria for LVH, may be normal variant. Nonspecific ST and T wave abnormality , probably digitalis effect. Abnormal ECG. When compared with ECG of 02-FEB-2020 08:38, Atrial fibrillation has replaced Electronic atrial pacemaker. Vent.  rate has increased BY  46 BPMT wave amplitude has increased in Inferior leadsT wave inversion now evident in Lateral leads    XR CHEST STANDARD (2 VW)   Final Result   Stable portable study. ASSESSMENT:    Active Hospital Problems    Diagnosis Date Noted    Pacemaker [Z95.0]     Atrial fibrillation, rapid ([address] [I48.91]     Benign essential HTN [I10]      PLAN:    Chest pain and shortness of breath in setting of atrial fibrillation with RVR in patient with h/o SVT  -EKG showed atrial fibrillation with RVR rate 200  -patient with permanent pacer  -patient cardioverted x 2 in ED  -multiple boluses of cardizem given in ED  -medtronic to interrogate pacemaker  -fentanyl given in ED  -Anticoagulated on Eliquis  -Continue beta-blocker  -Telemetry monitoring  -Cardiology consult-appreciate recommendations in advance  -Maintenance IV fluid  -echo on 5/1/2019:    -Limited echo for dizziness s/p a-fib ablation.  -Left ventricular cavity size is normal. There is mild concentric left ventricular hypertrophy. Overall left ventricular systolic        function appears normal with a estimated ejection fraction of 60%. -Mild mitral, tricuspid and pulmonic regurgitation.  -The aortic root is at the upper limits of normal.  -Pacer / ICD wire is visualized in the right heart.  -No pericardial effusion noted    Essential HTN  -Continue metoprolol    Hypomagnesemia, 1.6 on admission  -replaced in ED  -recheck mag in am    Hypothyroidism  -TSH 1.74 on 2/2/2020  -Continue levothyroxine    DM2, uncontrolled  - on admission  -hemglobin a1c 7.0 on 7/13/2018, repeat in am  -hold home metformin  -hdssi  -poct ac/hs  -hypoglycemia protocol  -carb control diet    Hyperlipidemia  -Continue pravastatin    DVT Prophylaxis: eliquis    Diet: No diet orders on file     Code Status: Prior    PT/OT Eval Status: No indication for need at this time    Dispo - 1-2 days pending clinical improvement     Lin Martínez, APRN - CNP    Thank you No primary care provider on file.  for the opportunity to be involved in this patient's care.  If you have any questions or concerns please feel free to contact me at [phone].  -------------------------------Dr. Brynn Little--------------------------------------------

## 2020-02-08 NOTE — PLAN OF CARE
Problem: Pain:  Goal: Pain level will decrease  Description  Pain level will decrease  2/8/2020 0956 by Ignacio Conn RN  Outcome: Ongoing  Note:   Pt will be satisfied with pain control with PRN Q4 morphine. Pt uses numeric pain rating scale with reassessments after pain med administration. Will continue to monitor progression throughout shift. Problem: Safety:  Goal: Free from accidental physical injury  Description  Free from accidental physical injury  2/8/2020 0956 by Ignacio Conn RN  Outcome: Ongoing  Note:   Pt will remain free from falls throughout hospital stay. Fall precautions in place, bed in lowest position with wheels locked and side rails 2/4 up. Room door open and hourly rounding completed. Will continue to monitor throughout shift. Problem: Cardiac:  Goal: Ability to maintain an adequate cardiac output will improve  Description  Ability to maintain an adequate cardiac output will improve  Outcome: Ongoing  Note:   Pt will have a controlled heart rate at time of discharge. Pt is on continuous telemetry, cardiology is consulted, cardizem drip running, and amiodarone oral to start today. Will continue to monitor.

## 2020-02-09 LAB
ANION GAP SERPL CALCULATED.3IONS-SCNC: 9 MMOL/L (ref 3–16)
BASOPHILS ABSOLUTE: 0.1 K/UL (ref 0–0.2)
BASOPHILS RELATIVE PERCENT: 0.8 %
BUN BLDV-MCNC: 9 MG/DL (ref 7–20)
CALCIUM SERPL-MCNC: 9.1 MG/DL (ref 8.3–10.6)
CHLORIDE BLD-SCNC: 101 MMOL/L (ref 99–110)
CO2: 29 MMOL/L (ref 21–32)
CREAT SERPL-MCNC: 1.1 MG/DL (ref 0.8–1.3)
EOSINOPHILS ABSOLUTE: 0.5 K/UL (ref 0–0.6)
EOSINOPHILS RELATIVE PERCENT: 7.3 %
ESTIMATED AVERAGE GLUCOSE: 157.1 MG/DL
GFR AFRICAN AMERICAN: >60
GFR NON-AFRICAN AMERICAN: >60
GLUCOSE BLD-MCNC: 153 MG/DL (ref 70–99)
GLUCOSE BLD-MCNC: 153 MG/DL (ref 70–99)
GLUCOSE BLD-MCNC: 196 MG/DL (ref 70–99)
GLUCOSE BLD-MCNC: 211 MG/DL (ref 70–99)
GLUCOSE BLD-MCNC: 229 MG/DL (ref 70–99)
GLUCOSE BLD-MCNC: 326 MG/DL (ref 70–99)
HBA1C MFR BLD: 7.1 %
HCT VFR BLD CALC: 39.1 % (ref 40.5–52.5)
HEMOGLOBIN: 13.5 G/DL (ref 13.5–17.5)
LYMPHOCYTES ABSOLUTE: 2.5 K/UL (ref 1–5.1)
LYMPHOCYTES RELATIVE PERCENT: 35.1 %
MCH RBC QN AUTO: 32.5 PG (ref 26–34)
MCHC RBC AUTO-ENTMCNC: 34.6 G/DL (ref 31–36)
MCV RBC AUTO: 94 FL (ref 80–100)
MONOCYTES ABSOLUTE: 0.7 K/UL (ref 0–1.3)
MONOCYTES RELATIVE PERCENT: 10.4 %
NEUTROPHILS ABSOLUTE: 3.3 K/UL (ref 1.7–7.7)
NEUTROPHILS RELATIVE PERCENT: 46.4 %
PDW BLD-RTO: 12.9 % (ref 12.4–15.4)
PERFORMED ON: ABNORMAL
PLATELET # BLD: 151 K/UL (ref 135–450)
PMV BLD AUTO: 9.2 FL (ref 5–10.5)
POTASSIUM REFLEX MAGNESIUM: 4.5 MMOL/L (ref 3.5–5.1)
RBC # BLD: 4.16 M/UL (ref 4.2–5.9)
SODIUM BLD-SCNC: 139 MMOL/L (ref 136–145)
WBC # BLD: 7 K/UL (ref 4–11)

## 2020-02-09 PROCEDURE — 36415 COLL VENOUS BLD VENIPUNCTURE: CPT

## 2020-02-09 PROCEDURE — 6370000000 HC RX 637 (ALT 250 FOR IP): Performed by: INTERNAL MEDICINE

## 2020-02-09 PROCEDURE — 99232 SBSQ HOSP IP/OBS MODERATE 35: CPT | Performed by: INTERNAL MEDICINE

## 2020-02-09 PROCEDURE — 6370000000 HC RX 637 (ALT 250 FOR IP): Performed by: NURSE PRACTITIONER

## 2020-02-09 PROCEDURE — 85025 COMPLETE CBC W/AUTO DIFF WBC: CPT

## 2020-02-09 PROCEDURE — 1200000000 HC SEMI PRIVATE

## 2020-02-09 PROCEDURE — 80048 BASIC METABOLIC PNL TOTAL CA: CPT

## 2020-02-09 PROCEDURE — 2580000003 HC RX 258: Performed by: NURSE PRACTITIONER

## 2020-02-09 RX ORDER — PROCHLORPERAZINE MALEATE 5 MG/1
5 TABLET ORAL EVERY 6 HOURS PRN
Status: DISCONTINUED | OUTPATIENT
Start: 2020-02-09 | End: 2020-02-11 | Stop reason: HOSPADM

## 2020-02-09 RX ORDER — NICOTINE 21 MG/24HR
1 PATCH, TRANSDERMAL 24 HOURS TRANSDERMAL DAILY
Status: DISCONTINUED | OUTPATIENT
Start: 2020-02-10 | End: 2020-02-11 | Stop reason: HOSPADM

## 2020-02-09 RX ADMIN — PRAVASTATIN SODIUM 80 MG: 80 TABLET ORAL at 08:02

## 2020-02-09 RX ADMIN — MORPHINE SULFATE 15 MG: 15 TABLET ORAL at 20:47

## 2020-02-09 RX ADMIN — AMIODARONE HYDROCHLORIDE 400 MG: 200 TABLET ORAL at 08:02

## 2020-02-09 RX ADMIN — INSULIN LISPRO 3 UNITS: 100 INJECTION, SOLUTION INTRAVENOUS; SUBCUTANEOUS at 08:03

## 2020-02-09 RX ADMIN — PRAMIPEXOLE DIHYDROCHLORIDE 0.25 MG: 0.25 TABLET ORAL at 13:27

## 2020-02-09 RX ADMIN — AMIODARONE HYDROCHLORIDE 400 MG: 200 TABLET ORAL at 20:43

## 2020-02-09 RX ADMIN — INSULIN LISPRO 3 UNITS: 100 INJECTION, SOLUTION INTRAVENOUS; SUBCUTANEOUS at 11:53

## 2020-02-09 RX ADMIN — METHYLPHENIDATE HYDROCHLORIDE 20 MG: 10 TABLET ORAL at 14:46

## 2020-02-09 RX ADMIN — DULOXETINE HYDROCHLORIDE 90 MG: 30 CAPSULE, DELAYED RELEASE ORAL at 08:01

## 2020-02-09 RX ADMIN — PANTOPRAZOLE SODIUM 20 MG: 20 TABLET, DELAYED RELEASE ORAL at 08:02

## 2020-02-09 RX ADMIN — MORPHINE SULFATE 15 MG: 15 TABLET ORAL at 13:27

## 2020-02-09 RX ADMIN — ASPIRIN 81 MG: 81 TABLET, COATED ORAL at 08:02

## 2020-02-09 RX ADMIN — Medication 10 ML: at 20:54

## 2020-02-09 RX ADMIN — LEVOTHYROXINE SODIUM 100 MCG: 100 TABLET ORAL at 06:51

## 2020-02-09 RX ADMIN — PRAMIPEXOLE DIHYDROCHLORIDE 0.25 MG: 0.25 TABLET ORAL at 08:02

## 2020-02-09 RX ADMIN — PREGABALIN 200 MG: 75 CAPSULE ORAL at 08:01

## 2020-02-09 RX ADMIN — APIXABAN 5 MG: 5 TABLET, FILM COATED ORAL at 08:02

## 2020-02-09 RX ADMIN — METOPROLOL SUCCINATE 50 MG: 50 TABLET, EXTENDED RELEASE ORAL at 20:44

## 2020-02-09 RX ADMIN — PRAMIPEXOLE DIHYDROCHLORIDE 0.25 MG: 0.25 TABLET ORAL at 17:11

## 2020-02-09 RX ADMIN — METOPROLOL SUCCINATE 50 MG: 50 TABLET, EXTENDED RELEASE ORAL at 08:02

## 2020-02-09 RX ADMIN — MORPHINE SULFATE 15 MG: 15 TABLET ORAL at 08:02

## 2020-02-09 RX ADMIN — MORPHINE SULFATE 15 MG: 15 TABLET ORAL at 02:37

## 2020-02-09 RX ADMIN — INSULIN LISPRO 6 UNITS: 100 INJECTION, SOLUTION INTRAVENOUS; SUBCUTANEOUS at 18:04

## 2020-02-09 RX ADMIN — PREGABALIN 200 MG: 75 CAPSULE ORAL at 20:43

## 2020-02-09 RX ADMIN — APIXABAN 5 MG: 5 TABLET, FILM COATED ORAL at 20:44

## 2020-02-09 RX ADMIN — INSULIN LISPRO 6 UNITS: 100 INJECTION, SOLUTION INTRAVENOUS; SUBCUTANEOUS at 20:47

## 2020-02-09 RX ADMIN — METHYLPHENIDATE HYDROCHLORIDE 20 MG: 10 TABLET ORAL at 08:02

## 2020-02-09 RX ADMIN — Medication 10 ML: at 08:02

## 2020-02-09 ASSESSMENT — PAIN SCALES - GENERAL
PAINLEVEL_OUTOF10: 8
PAINLEVEL_OUTOF10: 8
PAINLEVEL_OUTOF10: 7
PAINLEVEL_OUTOF10: 7
PAINLEVEL_OUTOF10: 9
PAINLEVEL_OUTOF10: 9
PAINLEVEL_OUTOF10: 8
PAINLEVEL_OUTOF10: 7
PAINLEVEL_OUTOF10: 7

## 2020-02-09 NOTE — PROGRESS NOTES
ALT 21   AST 20   ALKPHOS 67   PROT 6.2*   AGRATIO 1.6   BILITOT 0.3     Cardiac Enzymes:   Recent Labs     02/07/20  2143   TROPONINI <0.01     PT/INR:   Recent Labs     02/08/20  0730   PROTIME 14.9*   INR 1.28*        Raven Dior D.O., [facility]  Interventional Cardiology     o: [phone]  [facility] [address], [address]      NOTE:  This report was transcribed using voice recognition software. Every effort was made to ensure accuracy; however, inadvertent computerized transcription errors may be present.

## 2020-02-10 ENCOUNTER — TELEPHONE (OUTPATIENT)
Dept: CARDIOLOGY CLINIC | Age: 75
End: 2020-02-10

## 2020-02-10 VITALS
SYSTOLIC BLOOD PRESSURE: 136 MMHG | OXYGEN SATURATION: 94 % | HEART RATE: 69 BPM | DIASTOLIC BLOOD PRESSURE: 81 MMHG | TEMPERATURE: 97.7 F | WEIGHT: 221.56 LBS | BODY MASS INDEX: 30.01 KG/M2 | HEIGHT: 72 IN | RESPIRATION RATE: 16 BRPM

## 2020-02-10 LAB
A/G RATIO: 1.3 (ref 1.1–2.2)
ALBUMIN SERPL-MCNC: 3.7 G/DL (ref 3.4–5)
ALP BLD-CCNC: 71 U/L (ref 40–129)
ALT SERPL-CCNC: 20 U/L (ref 10–40)
ANION GAP SERPL CALCULATED.3IONS-SCNC: 10 MMOL/L (ref 3–16)
AST SERPL-CCNC: 17 U/L (ref 15–37)
BASOPHILS ABSOLUTE: 0.1 K/UL (ref 0–0.2)
BASOPHILS RELATIVE PERCENT: 0.8 %
BILIRUB SERPL-MCNC: 0.3 MG/DL (ref 0–1)
BUN BLDV-MCNC: 10 MG/DL (ref 7–20)
CALCIUM SERPL-MCNC: 9.1 MG/DL (ref 8.3–10.6)
CHLORIDE BLD-SCNC: 101 MMOL/L (ref 99–110)
CO2: 26 MMOL/L (ref 21–32)
CREAT SERPL-MCNC: 1.1 MG/DL (ref 0.8–1.3)
EOSINOPHILS ABSOLUTE: 0.5 K/UL (ref 0–0.6)
EOSINOPHILS RELATIVE PERCENT: 7.5 %
GFR AFRICAN AMERICAN: >60
GFR NON-AFRICAN AMERICAN: >60
GLOBULIN: 2.8 G/DL
GLUCOSE BLD-MCNC: 144 MG/DL (ref 70–99)
GLUCOSE BLD-MCNC: 161 MG/DL (ref 70–99)
GLUCOSE BLD-MCNC: 183 MG/DL (ref 70–99)
GLUCOSE BLD-MCNC: 193 MG/DL (ref 70–99)
GLUCOSE BLD-MCNC: 225 MG/DL (ref 70–99)
GLUCOSE BLD-MCNC: 237 MG/DL (ref 70–99)
HCT VFR BLD CALC: 39.8 % (ref 40.5–52.5)
HEMOGLOBIN: 13.8 G/DL (ref 13.5–17.5)
LYMPHOCYTES ABSOLUTE: 2 K/UL (ref 1–5.1)
LYMPHOCYTES RELATIVE PERCENT: 32.3 %
MCH RBC QN AUTO: 32.1 PG (ref 26–34)
MCHC RBC AUTO-ENTMCNC: 34.6 G/DL (ref 31–36)
MCV RBC AUTO: 92.8 FL (ref 80–100)
MONOCYTES ABSOLUTE: 0.7 K/UL (ref 0–1.3)
MONOCYTES RELATIVE PERCENT: 11.1 %
NEUTROPHILS ABSOLUTE: 3 K/UL (ref 1.7–7.7)
NEUTROPHILS RELATIVE PERCENT: 48.3 %
PDW BLD-RTO: 13 % (ref 12.4–15.4)
PERFORMED ON: ABNORMAL
PLATELET # BLD: 155 K/UL (ref 135–450)
PMV BLD AUTO: 8.9 FL (ref 5–10.5)
POTASSIUM REFLEX MAGNESIUM: 4.5 MMOL/L (ref 3.5–5.1)
RBC # BLD: 4.28 M/UL (ref 4.2–5.9)
SODIUM BLD-SCNC: 137 MMOL/L (ref 136–145)
TOTAL PROTEIN: 6.5 G/DL (ref 6.4–8.2)
WBC # BLD: 6.3 K/UL (ref 4–11)

## 2020-02-10 PROCEDURE — 97161 PT EVAL LOW COMPLEX 20 MIN: CPT

## 2020-02-10 PROCEDURE — 2580000003 HC RX 258: Performed by: NURSE PRACTITIONER

## 2020-02-10 PROCEDURE — 6370000000 HC RX 637 (ALT 250 FOR IP): Performed by: INTERNAL MEDICINE

## 2020-02-10 PROCEDURE — 97165 OT EVAL LOW COMPLEX 30 MIN: CPT

## 2020-02-10 PROCEDURE — 97530 THERAPEUTIC ACTIVITIES: CPT

## 2020-02-10 PROCEDURE — 6370000000 HC RX 637 (ALT 250 FOR IP): Performed by: NURSE PRACTITIONER

## 2020-02-10 PROCEDURE — 36415 COLL VENOUS BLD VENIPUNCTURE: CPT

## 2020-02-10 PROCEDURE — 85025 COMPLETE CBC W/AUTO DIFF WBC: CPT

## 2020-02-10 PROCEDURE — 80053 COMPREHEN METABOLIC PANEL: CPT

## 2020-02-10 PROCEDURE — 97116 GAIT TRAINING THERAPY: CPT

## 2020-02-10 RX ORDER — AMIODARONE HYDROCHLORIDE 400 MG/1
400 TABLET ORAL 2 TIMES DAILY
Qty: 30 TABLET | Refills: 0 | Status: SHIPPED | OUTPATIENT
Start: 2020-02-10 | End: 2020-05-27 | Stop reason: ALTCHOICE

## 2020-02-10 RX ORDER — METOPROLOL SUCCINATE 50 MG/1
50 TABLET, EXTENDED RELEASE ORAL 2 TIMES DAILY
Qty: 60 TABLET | Refills: 1 | Status: SHIPPED | OUTPATIENT
Start: 2020-02-10

## 2020-02-10 RX ADMIN — LEVOTHYROXINE SODIUM 100 MCG: 100 TABLET ORAL at 06:05

## 2020-02-10 RX ADMIN — DULOXETINE HYDROCHLORIDE 90 MG: 30 CAPSULE, DELAYED RELEASE ORAL at 09:13

## 2020-02-10 RX ADMIN — ASPIRIN 81 MG: 81 TABLET, COATED ORAL at 09:12

## 2020-02-10 RX ADMIN — APIXABAN 5 MG: 5 TABLET, FILM COATED ORAL at 09:12

## 2020-02-10 RX ADMIN — METHYLPHENIDATE HYDROCHLORIDE 20 MG: 10 TABLET ORAL at 09:12

## 2020-02-10 RX ADMIN — MORPHINE SULFATE 15 MG: 15 TABLET ORAL at 06:05

## 2020-02-10 RX ADMIN — INSULIN LISPRO 3 UNITS: 100 INJECTION, SOLUTION INTRAVENOUS; SUBCUTANEOUS at 09:44

## 2020-02-10 RX ADMIN — PRAVASTATIN SODIUM 80 MG: 80 TABLET ORAL at 09:12

## 2020-02-10 RX ADMIN — AMIODARONE HYDROCHLORIDE 400 MG: 200 TABLET ORAL at 09:12

## 2020-02-10 RX ADMIN — PREGABALIN 200 MG: 75 CAPSULE ORAL at 09:12

## 2020-02-10 RX ADMIN — MORPHINE SULFATE 15 MG: 15 TABLET ORAL at 10:14

## 2020-02-10 RX ADMIN — METHYLPHENIDATE HYDROCHLORIDE 20 MG: 10 TABLET ORAL at 16:24

## 2020-02-10 RX ADMIN — Medication 10 ML: at 13:40

## 2020-02-10 RX ADMIN — INSULIN LISPRO 3 UNITS: 100 INJECTION, SOLUTION INTRAVENOUS; SUBCUTANEOUS at 12:47

## 2020-02-10 RX ADMIN — MORPHINE SULFATE 15 MG: 15 TABLET ORAL at 16:25

## 2020-02-10 RX ADMIN — PANTOPRAZOLE SODIUM 20 MG: 20 TABLET, DELAYED RELEASE ORAL at 09:13

## 2020-02-10 RX ADMIN — METOPROLOL SUCCINATE 50 MG: 50 TABLET, EXTENDED RELEASE ORAL at 09:13

## 2020-02-10 ASSESSMENT — PAIN DESCRIPTION - LOCATION
LOCATION: BACK

## 2020-02-10 ASSESSMENT — PAIN SCALES - GENERAL
PAINLEVEL_OUTOF10: 8
PAINLEVEL_OUTOF10: 0
PAINLEVEL_OUTOF10: 8
PAINLEVEL_OUTOF10: 7
PAINLEVEL_OUTOF10: 8
PAINLEVEL_OUTOF10: 7
PAINLEVEL_OUTOF10: 8
PAINLEVEL_OUTOF10: 8

## 2020-02-10 ASSESSMENT — PAIN DESCRIPTION - FREQUENCY
FREQUENCY: CONTINUOUS

## 2020-02-10 ASSESSMENT — PAIN DESCRIPTION - DESCRIPTORS
DESCRIPTORS: ACHING;THROBBING
DESCRIPTORS: THROBBING
DESCRIPTORS: ACHING;THROBBING

## 2020-02-10 ASSESSMENT — PAIN DESCRIPTION - ORIENTATION
ORIENTATION: LOWER

## 2020-02-10 ASSESSMENT — PAIN DESCRIPTION - PAIN TYPE
TYPE: CHRONIC PAIN
TYPE: CHRONIC PAIN

## 2020-02-10 ASSESSMENT — PAIN DESCRIPTION - ONSET: ONSET: SUDDEN

## 2020-02-10 NOTE — PLAN OF CARE
Problem: Infection:  Goal: Will remain free from infection  Description  Will remain free from infection  Outcome: Ongoing  Note:   Pt will remain free from infections this hospitalization. Educated pt on importance of keeping DM under control to reduce infection risk. Continue to monitor and educate pt t/o hospitalization.

## 2020-02-10 NOTE — PROGRESS NOTES
Secure message sent to Pao Celis NP, \"Pt requesting nicotine patch. Could you place an order for one?  Thank you

## 2020-02-10 NOTE — DISCHARGE SUMMARY
Hospital Medicine Discharge Summary    Patient: Antonino Fam     Age: 69 y.o. Gender: male  : 1945   MRN: [id number]  Code Status: Full     Admit Date: 2020   Discharge Date: 2/10/2020    Disposition:  Home     Condition at Discharge: Stable    Primary Care Provider: No primary care provider on file. Admitting Physician: Daren Elam MD  Discharge Physician: Leeroy Rosenberg MD       Discharge Diagnoses: Active Hospital Problems    Diagnosis    Atrial fibrillation with RVR ([facility]) [I48.91]    Pacemaker [Z95.0]    Atrial fibrillation, rapid ([address] [I48.91]    Benign essential HTN [I10]       Hospital Course:   69 y.o. male, past medical history of atrial fibrillation, SVT, hypothyroidism, hypertension, hyperlipidemia and diabetes, who presented to [facility] with chest pain and shortness of breath. History obtained from the patient and review of EMR. The patient stated around 9 pm he began having left sided chest pain and shortness of breath. He stated he felt like his \"heart was beating out of his chest\" so he called EMS. Per EMR, enroute the patient was given ASA and nitroglycerin d/t concern for MI. When the patient arrived to the ED he was found to be in atrial fibrillation with RVR at 200 bmp. He was given multiple doses of IV cardizem, fentanyl, etomidate and cardioverted x 2. The patient does have a pacemaker and it was interrogated in the ED. The patient stated he was recently discharged from here at [facility] on  for episodes of rapid heart rate and was found to be in SVT at that time. He is anticoagulated on eliquis. The patient follows with Dr. Laurie Galeas from cardiology. Cardiology has been consulted. Assessment/Plan:    Chest pain and shortness of breath in setting of atrial fibrillation with RVR in patient with h/o SVT  -EKG on admission showed atrial fibrillation with RVR rate 200.  This morning Junctional rhythm with rate 94  -patient with permanent as of 2/10/2020  6:30 PM      CONTINUE these medications which have CHANGED    Details   metoprolol succinate (TOPROL XL) 50 MG extended release tablet Take 1 tablet by mouth 2 times daily, Disp-60 tablet, R-1Normal           Discharge Medication List as of 2/10/2020  6:30 PM      CONTINUE these medications which have NOT CHANGED    Details   b complex vitamins capsule Take 1 capsule by mouth daily, Disp-30 capsule, R-3Normal      levothyroxine (SYNTHROID) 100 MCG tablet Take 100 mcg by mouth DailyHistorical Med      Apixaban (ELIQUIS PO) Take 5 mg by mouth 2 times daily Historical Med      Cholecalciferol (VITAMIN D-3) 25 MCG (1000 UT) CAPS Take by mouthHistorical Med      methocarbamol (ROBAXIN) 500 MG tablet Take 500 mg by mouth 3 times daily as neededHistorical Med      Morphine Sulfate ER 15 MG T12A Take 15 mg by mouth every 4 hours as needed. Maximum of 41/2 doses daily. Historical Med      nicotine (NICODERM CQ) 14 MG/24HR Place 1 patch onto the skin every 24 hoursHistorical Med      methylphenidate (RITALIN) 10 MG tablet Take 20 mg by mouth 2 times daily. Historical Med      testosterone (ANDROGEL) 25 MG/2.5GM (1%) GEL 1 % gel Apply 5 g topically daily. 2 pumps once a day. Historical Med      docusate sodium (COLACE) 100 MG capsule Take 100 mg by mouth 2 times dailyHistorical Med      pregabalin (LYRICA) 100 MG capsule Take 2 capsules by mouth 2 times daily for 30 days. ., Disp-6 capsule, R-0Print      pramipexole (MIRAPEX) 0.25 MG tablet Take 0.25 mg by mouth 3 times daily Take 1 at 5pm, and 9 at bedtime as needed and one later at night as needed.   May take 1 tablet prn for breakthrough symptoms of restless leg syndromeHistorical Med      DULoxetine (CYMBALTA) 60 MG extended release capsule Take 90 mg by mouth daily Historical Med      aspirin 81 MG EC tablet Take 81 mg by mouth dailyHistorical Med      pantoprazole (PROTONIX) 20 MG tablet Take 20 mg by mouth dailyHistorical Med      metFORMIN (GLUCOPHAGE) 500 Hospital Status: Inpatient. Height: 72 inches Weight: 236 pounds  Risk Factors   The patient risk factors include:obesity, hypertension, family history of  premature CAD, diabetes mellitus and dyslipidemia. Conclusions   Summary  Normal LV function. There is normal isotope uptake at stress and rest. There is no evidence of  myocardial ischemia or scar. Normal myocardial perfusion study. Stress Protocols   Resting ECG  ectopic atrial rhythm   Resting HR:64 bpm  Resting BP:155/88 mmHg  Stress Protocol:Pharmacologic - Lexiscan's  Peak HR:67 bpm                   HR/BP product:[id number]  Peak BP:152/89 mmHg  Predicted HR: 148 bpm  % of predicted HR: 45  Test duration: 4 min  Reason for termination:Completed   ECG Findings  No ST changes  EKGs are negative for ischemia   Symptoms  No symptoms with lexiscan. Complications  Procedure complication was none. Stress Interpretation  Normal LVEF >60%  Normal wall motion  No ischemia  Procedure Medications   - Lexiscan I.V. 0.4 mg.  Imaging Protocols   - One Day   Rest                          Stress   Isotope:Myoview/Tetrofosmin   Isotope: Myoview/Tetrofosmin  Isotope dose:10.2 mCi         Isotope dose:30.7 mCi  Administration Route:I.V. Administration Route:I.V.  Date:02/03/2020 11:40         Date:02/03/2020 13:15                                 Technique:      Gated  Imaging Results   Applied corrections   - Motion correction  applied     Stress ejection    Ejection fraction:78 %    EDV :104 ml    ESV :23 ml    Stroke volume :81 ml    LV mass :137 gr  Medical History   Additional Medical History   Afib   Signatures   ------------------------------------------------------------------  Electronically signed by Yuli Adams MD (Interpreting  physician) on 02/03/2020 at 14:52  ------------------------------------------------------------------          Consults:     IP CONSULT TO HOSPITALIST  IP CONSULT TO CARDIOLOGY    Labs:  For convenience and continuity at follow-up the following most recent labs are provided:    Lab Results   Component Value Date    WBC 6.3 02/10/2020    HGB 13.8 02/10/2020    HCT 39.8 02/10/2020    MCV 92.8 02/10/2020     02/10/2020     02/10/2020    K 4.5 02/10/2020     02/10/2020    CO2 26 02/10/2020    BUN 10 02/10/2020    CREATININE 1.1 02/10/2020    CALCIUM 9.1 02/10/2020    TROPONINI <0.01 02/07/2020    ALKPHOS 71 02/10/2020    ALT 20 02/10/2020    AST 17 02/10/2020    BILITOT 0.3 02/10/2020    LABALBU 3.7 02/10/2020    LDLCALC 63 02/02/2020    TRIG 174 02/02/2020    LABA1C 7.1 02/08/2020     Lab Results   Component Value Date    INR 1.28 (H) 02/08/2020    INR 2.34 (H) 05/01/2019    INR 3.36 (H) 04/30/2019         The patient was seen and examined on day of discharge and this discharge summary is in conjunction with any daily progress note from day of discharge. Time spent on discharge is more than 30 minutes in the examination, evaluation, counseling and review of medications and discharge plan. Signed:    Maru Kern MD   3/4/2020    Thank you No primary care provider on file. for the opportunity to be involved in this patient's care. If you have any questions or concerns please feel free to contact my office [phone].

## 2020-02-10 NOTE — PROGRESS NOTES
Occupational Therapy   Occupational Therapy Initial Assessment/Treatment/Discharge  1x only  Date: 2/10/2020   Patient Name: Kyleigh Forbes  MRN: [id number]     : 1945    Date of Service: 2/10/2020    Discharge Recommendations:  Home with assist PRN       Assessment   Performance deficits / Impairments: Decreased functional mobility ; Decreased balance    Assessment: Pt 70 yo male functioning with deficits in the areas listed above following A fib. Pt reports IND PLOF and lives at home with spouse. Pt is currently at a S-mod I level for functional mobility and transfers. Pt is currently functioning close to baseline and demo's no further needs for OT at this time. Cont with POC. Prognosis: Good  Decision Making: Low Complexity  OT Education: OT Role;Transfer Training;Energy Conservation  No Skilled OT: At baseline function  REQUIRES OT FOLLOW UP: No  Activity Tolerance  Activity Tolerance: Patient Tolerated treatment well  Safety Devices  Safety Devices in place: Yes  Type of devices: Call light within reach; Left in bed;Gait belt;Nurse notified; Bed alarm in place           Patient Diagnosis(es): The primary encounter diagnosis was Rapid atrial fibrillation ([address] Diagnoses of Chest pain, unspecified type and Hypomagnesemia were also pertinent to this visit. has a past medical history of Arthritis, Atrial fibrillation [facility]), Cardiac pacemaker, Chronic back pain, Diabetes mellitus ([address] History of blood transfusion, Hyperlipidemia, Hypertension, Psychiatric problem, and Thyroid disease. has a past surgical history that includes Colonoscopy; Endoscopy, colon, diagnostic; eye surgery; fracture surgery; pacemaker placement; and skin biopsy.            Restrictions  Restrictions/Precautions  Restrictions/Precautions: General Precautions, Fall Risk, Up as Tolerated    Subjective   General  Chart Reviewed: Yes  Patient assessed for rehabilitation services?: Yes  Family / Caregiver Present: No  Referring Practitioner: Kassi Capone MD  Diagnosis: Afib  Subjective  Subjective: pt agreeable to therapy  General Comment  Comments: RN approved therapy  Pain Assessment  Pain Assessment: 0-10  Pain Level: 7  Pain Location: Back  Pain Orientation: Lower  Pain Descriptors:  Throbbing  Pain Frequency: Continuous  Pain Onset: Sudden  Vital Signs  Temp: 97.6 °F (36.4 °C)  Temp Source: Oral  Pulse: 63  Heart Rate Source: Monitor  Resp: 16  BP: 124/73  BP Location: Left upper arm  BP Upper/Lower: Upper  MAP (mmHg): 90  Patient Position: Supine  Oxygen Therapy  SpO2: 98 %  O2 Device: None (Room air)     Social/Functional History  Social/Functional History  Lives With: Spouse  Type of Home: House  Home Layout: One level  Home Access: Level entry  Bathroom Shower/Tub: Walk-in shower, Tub/Shower unit, Shower chair with back  Bathroom Toilet: Handicap height  Home Equipment: Cane, Wheelchair-manual, Standard walker, Rolling walker  ADL Assistance: Independent  Homemaking Responsibilities: No  Ambulation Assistance: Independent([facility])  Transfer Assistance: Independent  Active : Yes  Occupation: Retired  Type of occupation: [employer]   Leisure & Hobbies: camping, movies, work on house       Objective   Vision: Within Functional Limits  Hearing: Exceptions to [facility]  Hearing Exceptions: Bilateral hearing aid    Orientation  Overall Orientation Status: Within Functional Limits  Observation/Palpation  Posture: Fair  Balance  Sitting Balance: Modified independent   Standing Balance: Supervision(SPC)  Functional Mobility  Functional - Mobility Device: Cane  Activity: Other  Assist Level: Supervision  Functional Mobility Comments: functional mobility in hallway in preparation for household distances   ADL  UE Dressing: Supervision(don jacket)  LE Dressing: Supervision(maddi slippers)  Tone RUE  RUE Tone: Normotonic  Tone LUE  LUE Tone: Normotonic  Coordination  Movements Are Fluid And Coordinated: Yes     Bed mobility  Supine to Sit: Modified independent(HOB elevated )  Sit to Supine: Modified independent(HOB elevated)  Transfers  Sit to stand: Supervision  Stand to sit: Supervision     Cognition  Overall Cognitive Status: WFL                 LUE AROM (degrees)  LUE AROM : WFL  RUE AROM (degrees)  RUE AROM : WFL  LUE Strength  Gross LUE Strength: WFL  RUE Strength  Gross RUE Strength: WFL                   Plan   Plan  Times per week: 1 x only      AM-PAC Score        AM-PAC Inpatient Daily Activity Raw Score: 22 (02/10/20 1348)  AM-PAC Inpatient ADL T-Scale Score : 47.1 (02/10/20 1348)  ADL Inpatient CMS 0-100% Score: 25.8 (02/10/20 1348)  ADL Inpatient CMS G-Code Modifier : Jadon Kay (02/10/20 1348)    Goals  Short term goals  Time Frame for Short term goals: 1 x only  Short term goal 1: Pt will complete functional mobility with S.  GOAL MET       Therapy Time   Individual Concurrent Group Co-treatment   Time In 1052(10 minutes for evaluation)         Time Out 1115         Minutes 23         Timed Code Treatment Minutes: [address] [facility] KEITH CampbellR/CRIS

## 2020-02-10 NOTE — TELEPHONE ENCOUNTER
Pt is currently IP at [facility] s/p Cardioversion 2/2. The pt called for a f/u appt and was provided 2/21. Pt wants to know if he should be seen sooner with an NP or is the 2/21 appt ok? Please call to advise on his cell phone.

## 2020-02-10 NOTE — PROGRESS NOTES
Co-treatment   Time In 1052         Time Out 1115         Minutes 23         Timed Code Treatment Minutes: Tammy, [address], [place] #[id number]

## 2020-02-11 ENCOUNTER — TELEPHONE (OUTPATIENT)
Dept: CARDIOLOGY CLINIC | Age: 75
End: 2020-02-11

## 2020-02-11 NOTE — TELEPHONE ENCOUNTER
Called pt. He was seen in the ER on 20 and was started on PO amiodarone for recurrent AF with RVR. He was discharged on amiodarone 400 mg BID, but he has not started taking it yet. Instructed pt on amiodarone loadin mg BID x1 week, then 200 mg BID x1 week, then 200 mg QD. Pt VU with POC.     Elizabeth Jaeger, NIXON-CNP

## 2020-02-11 NOTE — TELEPHONE ENCOUNTER
Pt calling when he was sent home from the hospital they gave him a Rx for Amiodarone 400 mg BID. Pt and pharmacist thinks that is way too high of a dosage a day. Pt hasn't started the medication and won't until he hears from [facility] that it's what he wants pt to take?  Pls call to advise Thank you

## 2020-02-20 NOTE — PROGRESS NOTES
hand    PACEMAKER PLACEMENT      SKIN BIOPSY      Right forearm       Allergies: Allergies   Allergen Reactions    Requip [Ropinirole Hcl]      Coordination problem, visual disturbance.  Zolpidem      Amnesia, nightmares from past war experiences       Social History:   reports that he has quit smoking. He has never used smokeless tobacco. He reports current alcohol use. He reports that he does not use drugs. Family History:  family history includes Heart Attack in his brother and father; Stroke in his mother. Reviewed. Denies family history of sudden cardiac death, arrhythmia, premature CAD    Review of System:  All other systems reviewed and are negative except for that noted above. Pertinent negatives are:   General: negative for fever, chills   Ophthalmic ROS: negative for - eye pain or loss of vision  ENT ROS: negative for - headaches, sore throat   Respiratory: negative for - cough, sputum  Cardiovascular: Reviewed in HPI  Gastrointestinal: negative for - abdominal pain, diarrhea, N/V  Hematology: negative for - bleeding, blood clots, bruising or jaundice  Genito-Urinary:  negative for - Dysuria or incontinence  Musculoskeletal: negative for - Joint swelling, muscle pain  Neurological: negative for - confusion, dizziness, headaches   Psychiatric: No anxiety, no depression. Dermatological: negative for - rash    Physical Examination:  Vitals:    02/21/20 1031   BP: (!) 153/107   Pulse: Wt Readings from Last 3 Encounters:   02/21/20 234 lb (106.1 kg)   02/08/20 221 lb 9 oz (100.5 kg)   02/02/20 236 lb (107 kg)       · Constitutional: Oriented. No distress. · Head: Normocephalic and atraumatic. · Mouth/Throat: Oropharynx is clear and moist.   · Eyes: Conjunctivae normal. EOM are normal.   · Neck: Neck supple. No rigidity. No JVD present. · Cardiovascular: Normal rate, regular rhythm, S1&S2. · Pulmonary/Chest: Bilateral respiratory sounds. No wheezes, No rhonchi. · Abdominal: Soft. as needed      Morphine Sulfate ER 15 MG T12A Take 15 mg by mouth every 4 hours as needed. Maximum of 41/2 doses daily.  nicotine (NICODERM CQ) 14 MG/24HR Place 1 patch onto the skin every 24 hours      methylphenidate (RITALIN) 10 MG tablet Take 20 mg by mouth 2 times daily.  testosterone (ANDROGEL) 25 MG/2.5GM (1%) GEL 1 % gel Apply 5 g topically daily. 2 pumps once a day.  docusate sodium (COLACE) 100 MG capsule Take 100 mg by mouth 2 times daily      pramipexole (MIRAPEX) 0.25 MG tablet Take 0.25 mg by mouth 3 times daily Take 1 at 5pm, and 9 at bedtime as needed and one later at night as needed. May take 1 tablet prn for breakthrough symptoms of restless leg syndrome      DULoxetine (CYMBALTA) 60 MG extended release capsule Take 90 mg by mouth daily       aspirin 81 MG EC tablet Take 81 mg by mouth daily      pantoprazole (PROTONIX) 20 MG tablet Take 20 mg by mouth daily      metFORMIN (GLUCOPHAGE) 500 MG tablet Take 500 mg by mouth 2 times daily (with meals)      pravastatin (PRAVACHOL) 80 MG tablet Take 80 mg by mouth daily      melatonin 3 MG TABS tablet Take 6 mg by mouth nightly May take 1 additional tablet in 1 hour if no effect      pregabalin (LYRICA) 100 MG capsule Take 2 capsules by mouth 2 times daily for 30 days. . (Patient not taking: Reported on 2/21/2020) 6 capsule 0     No current facility-administered medications for this visit. Assessment and plan:        Atrial fibrillation,and flutter   S/p redo ablation   Had No recurrence until recently when he has had symptomatic episodes suggestive of atrial tachycardia    Has had episodes which were symptomatic and suggestive of atrial tachycardia    Continue current treatment.    amiodarone 400 bid for 1 more week and then 200 mg qd until he sees me in 2 months and then 100 and to stop in a month after    Will see if he has recurrence afterwards and consider ablation  Nilsa was turned on    HTN   BP is not well

## 2020-02-21 ENCOUNTER — NURSE ONLY (OUTPATIENT)
Dept: CARDIOLOGY CLINIC | Age: 75
End: 2020-02-21
Payer: OTHER GOVERNMENT

## 2020-02-21 ENCOUNTER — OFFICE VISIT (OUTPATIENT)
Dept: CARDIOLOGY CLINIC | Age: 75
End: 2020-02-21
Payer: OTHER GOVERNMENT

## 2020-02-21 VITALS
BODY MASS INDEX: 31.69 KG/M2 | HEART RATE: 69 BPM | WEIGHT: 234 LBS | SYSTOLIC BLOOD PRESSURE: 153 MMHG | DIASTOLIC BLOOD PRESSURE: 107 MMHG | HEIGHT: 72 IN

## 2020-02-21 PROCEDURE — 99215 OFFICE O/P EST HI 40 MIN: CPT | Performed by: INTERNAL MEDICINE

## 2020-02-21 PROCEDURE — 93280 PM DEVICE PROGR EVAL DUAL: CPT | Performed by: INTERNAL MEDICINE

## 2020-05-27 ENCOUNTER — NURSE ONLY (OUTPATIENT)
Dept: CARDIOLOGY CLINIC | Age: 75
End: 2020-05-27
Payer: OTHER GOVERNMENT

## 2020-05-27 ENCOUNTER — OFFICE VISIT (OUTPATIENT)
Dept: CARDIOLOGY CLINIC | Age: 75
End: 2020-05-27
Payer: OTHER GOVERNMENT

## 2020-05-27 ENCOUNTER — HOSPITAL ENCOUNTER (OUTPATIENT)
Age: 75
Discharge: HOME | End: 2020-05-27
Payer: OTHER GOVERNMENT

## 2020-05-27 VITALS
HEART RATE: 74 BPM | WEIGHT: 228 LBS | HEIGHT: 72 IN | BODY MASS INDEX: 30.88 KG/M2 | SYSTOLIC BLOOD PRESSURE: 129 MMHG | DIASTOLIC BLOOD PRESSURE: 74 MMHG

## 2020-05-27 LAB
ALBUMIN SERPL-MCNC: 4.2 G/DL (ref 3.4–5)
ANION GAP SERPL CALCULATED.3IONS-SCNC: 13 MMOL/L (ref 3–16)
BUN BLDV-MCNC: 14 MG/DL (ref 7–20)
CALCIUM SERPL-MCNC: 9.7 MG/DL (ref 8.3–10.6)
CHLORIDE BLD-SCNC: 101 MMOL/L (ref 99–110)
CO2: 24 MMOL/L (ref 21–32)
CREAT SERPL-MCNC: 1 MG/DL (ref 0.8–1.3)
GFR AFRICAN AMERICAN: >60
GFR NON-AFRICAN AMERICAN: >60
GLUCOSE BLD-MCNC: 171 MG/DL (ref 70–99)
HCT VFR BLD CALC: 38.4 % (ref 40.5–52.5)
HEMOGLOBIN: 13.2 G/DL (ref 13.5–17.5)
MCH RBC QN AUTO: 32.5 PG (ref 26–34)
MCHC RBC AUTO-ENTMCNC: 34.2 G/DL (ref 31–36)
MCV RBC AUTO: 94.9 FL (ref 80–100)
PDW BLD-RTO: 13.9 % (ref 12.4–15.4)
PHOSPHORUS: 2.6 MG/DL (ref 2.5–4.9)
PLATELET # BLD: 172 K/UL (ref 135–450)
PMV BLD AUTO: 11.1 FL (ref 5–10.5)
POTASSIUM SERPL-SCNC: 3.6 MMOL/L (ref 3.5–5.1)
RBC # BLD: 4.05 M/UL (ref 4.2–5.9)
SODIUM BLD-SCNC: 138 MMOL/L (ref 136–145)
T3 TOTAL: 1.07 NG/ML (ref 0.8–2)
T4 FREE: 1.6 NG/DL (ref 0.9–1.8)
TSH REFLEX: 0.23 UIU/ML (ref 0.27–4.2)
WBC # BLD: 7 K/UL (ref 4–11)

## 2020-05-27 PROCEDURE — 80069 RENAL FUNCTION PANEL: CPT

## 2020-05-27 PROCEDURE — 99214 OFFICE O/P EST MOD 30 MIN: CPT | Performed by: INTERNAL MEDICINE

## 2020-05-27 PROCEDURE — 93280 PM DEVICE PROGR EVAL DUAL: CPT | Performed by: INTERNAL MEDICINE

## 2020-05-27 PROCEDURE — 36415 COLL VENOUS BLD VENIPUNCTURE: CPT

## 2020-05-27 PROCEDURE — 84443 ASSAY THYROID STIM HORMONE: CPT

## 2020-05-27 PROCEDURE — 84439 ASSAY OF FREE THYROXINE: CPT

## 2020-05-27 PROCEDURE — 85027 COMPLETE CBC AUTOMATED: CPT

## 2020-05-27 PROCEDURE — 84480 ASSAY TRIIODOTHYRONINE (T3): CPT

## 2020-05-27 NOTE — PROGRESS NOTES
[address]   Electrophysiology hospital follow up   Date: 5/27/2020      Chief Complaint   Patient presents with    Follow-up     2 month f/u    Atrial Fibrillation    Fatigue     no energy, weight gain, loss of appetite    Dizziness     very dizzy all the time        HPI: Aleks Carr is a 69 y.o. with a past history of paroxysmal atrial fib. He had a PPM implanted in 2015. He has been following up with his device at [place]. He had an AF ablation in 2011 and 5/2017. He had a redo AF ablation on 4/29/19 for symptomatic AF. Eric Thomas presented to the ED 2/2//20 with palpitations and dizziness. Found to be in SVT, converted on his own, but recurred 2 more times, again converting on his own. His pacemaker was interrogated showing 24 episodes of SVT, 12 atrial fib/flutter and on 1/8/20 he had 8 episodes of NSVT. Some ventricular rates in the 200's. His magnesium level was 1.6 and was given IV magnesium. His Toprol XL was increased to 50mg daily. He returned to the ED 2/7/20 with chest pain and AF with RVR. He was cardioverted in the ED and admitted. He was started on Amiodarone. His troponin's were negative, he remained in sinus rhythm and was later discharged to follow up outpatient. Interval history:  He is feeling well. He has gained weight and doesn't know why. He fatigues easily and experiences dizziness. He can not attribute the dizziness to any specific activity. He does not hydrate often.     Past Medical History:   Diagnosis Date    Arthritis     Atrial fibrillation [facility])     Cardiac pacemaker     Chronic back pain     Diabetes mellitus ([facility] [address]     History of blood transfusion     Hyperlipidemia     Hypertension     Psychiatric problem     PTSD    Thyroid disease         Past Surgical History:   Procedure Laterality Date    COLONOSCOPY      ENDOSCOPY, COLON, DIAGNOSTIC      EYE SURGERY      Left eye cornia and cataracts    FRACTURE SURGERY      Left hand    PACEMAKER PLACEMENT      SKIN BIOPSY      Right forearm       Allergies: Allergies   Allergen Reactions    Requip [Ropinirole Hcl]      Coordination problem, visual disturbance.  Zolpidem      Amnesia, nightmares from past war experiences       Social History:   reports that he has quit smoking. He has never used smokeless tobacco. He reports current alcohol use. He reports that he does not use drugs. Family History:  family history includes Heart Attack in his brother and father; Stroke in his mother. Reviewed. Denies family history of sudden cardiac death, arrhythmia, premature CAD    Review of System:  All other systems reviewed and are negative except for that noted above. Pertinent negatives are:   General: negative for fever, chills   Ophthalmic ROS: negative for - eye pain or loss of vision  ENT ROS: negative for - headaches, sore throat   Respiratory: negative for - cough, sputum  Cardiovascular: Reviewed in HPI  Gastrointestinal: negative for - abdominal pain, diarrhea, N/V  Hematology: negative for - bleeding, blood clots, bruising or jaundice  Genito-Urinary:  negative for - Dysuria or incontinence  Musculoskeletal: negative for - Joint swelling, muscle pain  Neurological: negative for - confusion, dizziness, headaches   Psychiatric: No anxiety, no depression. Dermatological: negative for - rash    Physical Examination:  Vitals:    05/27/20 1600   BP: 129/74   Pulse: 74      Wt Readings from Last 3 Encounters:   05/27/20 228 lb (103.4 kg)   02/21/20 234 lb (106.1 kg)   02/08/20 221 lb 9 oz (100.5 kg)       · Constitutional: Oriented. No distress. · Head: Normocephalic and atraumatic. · Mouth/Throat: Oropharynx is clear and moist.   · Eyes: Conjunctivae normal. EOM are normal.   · Neck: Neck supple. No rigidity. No JVD present. · Cardiovascular: Normal rate, regular rhythm, S1&S2. · Pulmonary/Chest: Bilateral respiratory sounds. No wheezes, No rhonchi. · Abdominal: Soft.  Bowel atrial tachycardia/atrial flutter longest 1.5 minut    Will see if he has recurrence afterwards and consider ablation  Nilsa was turned on    HTN   BP is  well controlled. Continue current meds. Bradycardia   S/p Permanent pacemaker      Pacemaker   The CIED was interrogated and programmed and I supervised and reviewed all the data. All findings and changes are in device interrogation sheat and reflect my personal interpretation and changes and is scanned to Epic.    90% AP  Increase rate response     I independently reviewed device check interrogation     Plan:    Renal  CBC  TSH  Echo  Follow up in 2 months    Thank you for allowing me to participate in the care of Mansi Macdonald. Further evaluation will be based upon the patient's clinical course and testing results. All questions and concerns were addressed to the patient/family. Alternatives to my treatment were discussed. I have discussed the above stated plan and the patient verbalized understanding and agreed with the plan. NOTE: This report was transcribed using voice recognition software. Every effort was made to ensure accuracy, however, inadvertent computerized transcription errors may be present. Surya Casas MD, [place] [address] [facility]   Office: [phone]    Scribe attestation:  This note was scribed in the presence of Surya Casas MD by Jessica Hobbs RN    I, Dr. Surya Casas personally performed the services described in this documentation as scribed by RN in my presence, and it is both accurate and complete.

## 2020-05-27 NOTE — PROGRESS NOTES
Pt seen in clinic today for cardiac device interrogation. Their device is a  MDT 2 chamber pacemaker    Based on threshold, impedance, and intrinsic sensing tests run today, the device appears to be functioning normally. Remaining battery life is 3.5y  AP 84.99%                  0.05%      Episodes: 1 vtns see pdf for details    Pt was informed of findings today and general questions have been answered with regard to device. Pt to see Dr. Galvan Learn in clinic today following their device check.

## 2020-07-28 NOTE — PROGRESS NOTES
[address]   Electrophysiology follow up   Date: 7/29/2020      Chief Complaint   Patient presents with    Follow-up     2 month    Atrial Fibrillation        HPI: Nik Kamara is a 69 y.o. with a past history of paroxysmal atrial fib. He had a PPM implanted in 2015. He has been following up with his device at [place]. He had an AF ablation in 2011 and 5/2017. He had a redo AF ablation on 4/29/19 for symptomatic AF. Torrie Lundborg presented to the ED 2/2//20 with palpitations and dizziness. Found to be in SVT, converted on his own, but recurred 2 more times, again converting on his own. His pacemaker was interrogated showing 24 episodes of SVT, 12 atrial fib/flutter and on 1/8/20 he had 8 episodes of NSVT. Some ventricular rates in the 200's. His magnesium level was 1.6 and was given IV magnesium. His Toprol XL was increased to 50mg daily. He returned to the ED 2/7/20 with chest pain and AF with RVR. He was cardioverted in the ED and admitted. He was started on Amiodarone. His troponin's were negative, he remained in sinus rhythm and was later discharged to follow up outpatient. Interval history:    Torrie Lundborg presents to the office in 2 month follow up. He states he has been ok except for some lethargy. He states if he has nothing to do he just stays in bed. He has kept himself very isolated because of covid-19. He was unable to have his echo d/t insurance issues with the [place]. We will contact the [place] to have his ECHO completed. He wants to restart his testosterone and we suggested doing half a dose to start.     Past Medical History:   Diagnosis Date    Arthritis     Atrial fibrillation [facility])     Cardiac pacemaker     Chronic back pain     Diabetes mellitus ([facility] [address]     History of blood transfusion     Hyperlipidemia     Hypertension     Psychiatric problem     PTSD    Thyroid disease         Past Surgical History:   Procedure Laterality Date    COLONOSCOPY      ENDOSCOPY, COLON, DIAGNOSTIC  EYE SURGERY      Left eye cornia and cataracts    FRACTURE SURGERY      Left hand    PACEMAKER PLACEMENT      SKIN BIOPSY      Right forearm       Allergies: Allergies   Allergen Reactions    Requip [Ropinirole Hcl]      Coordination problem, visual disturbance.  Zolpidem      Amnesia, nightmares from past war experiences       Social History:   reports that he has quit smoking. He has never used smokeless tobacco. He reports current alcohol use. He reports that he does not use drugs. Family History:  family history includes Heart Attack in his brother and father; Stroke in his mother. Reviewed. Denies family history of sudden cardiac death, arrhythmia, premature CAD    Review of System:  All other systems reviewed and are negative except for that noted above. Pertinent negatives are:   General: negative for fever, chills   Ophthalmic ROS: negative for - eye pain or loss of vision  ENT ROS: negative for - headaches, sore throat   Respiratory: negative for - cough, sputum  Cardiovascular: Reviewed in HPI  Gastrointestinal: negative for - abdominal pain, diarrhea, N/V  Hematology: negative for - bleeding, blood clots, bruising or jaundice  Genito-Urinary:  negative for - Dysuria or incontinence  Musculoskeletal: negative for - Joint swelling, muscle pain  Neurological: negative for - confusion, dizziness, headaches   Psychiatric: No anxiety, no depression. Dermatological: negative for - rash    Physical Examination:  Vitals:    07/29/20 1555   BP: 121/80   Pulse: 103      Wt Readings from Last 3 Encounters:   07/29/20 226 lb (102.5 kg)   05/27/20 228 lb (103.4 kg)   02/21/20 234 lb (106.1 kg)       · Constitutional: Oriented. No distress. · Head: Normocephalic and atraumatic. · Mouth/Throat: Oropharynx is clear and moist.   · Eyes: Conjunctivae normal. EOM are normal.   · Neck: Neck supple. No rigidity. No JVD present. · Cardiovascular: Normal rate, regular rhythm, S1&S2. · Pulmonary/Chest: Bilateral respiratory sounds. No wheezes, No rhonchi. · Abdominal: Soft. Bowel sounds present. No distension, No tenderness. · Musculoskeletal: No tenderness. No edema    · Lymphadenopathy: Has no cervical adenopathy. · Neurological: Alert and oriented. Cranial nerve appears intact, No Gross deficit   · Skin: Skin is warm and dry. No rash noted. · Psychiatric: Has a normal behavior       Labs, diagnostic and imaging results reviewed. Lab Results   Component Value Date    TSHREFLEX 0.23 2020    CREATININE 1.0 2020    CREATININE 1.1 02/10/2020    AST 17 02/10/2020    ALT 20 02/10/2020     Reviewed. EC20   SR    Echo: 19  Summary   -Limited echo for dizziness s/p a-fib ablation.   -Left ventricular cavity size is normal. There is mild concentric left   ventricular hypertrophy. Overall left ventricular systolic function appears   normal with a estimated ejection fraction of 60%.  -Mild mitral, tricuspid and pulmonic regurgitation.   -The aortic root is at the upper limits of normal.   -Pacer / ICD wire is visualized in the right heart.   -No pericardial effusion noted.     Nuclear stress:  2/3/20  Summary    Normal LV function.   Dorothye Hero is normal isotope uptake at stress and rest. There is no evidence of    myocardial ischemia or scar.    Normal myocardial perfusion study.             Medication:  Current Outpatient Medications   Medication Sig Dispense Refill    metoprolol succinate (TOPROL XL) 50 MG extended release tablet Take 1 tablet by mouth 2 times daily 60 tablet 1    b complex vitamins capsule Take 1 capsule by mouth daily 30 capsule 3    levothyroxine (SYNTHROID) 100 MCG tablet Take 100 mcg by mouth Daily      Apixaban (ELIQUIS PO) Take 5 mg by mouth 2 times daily       Cholecalciferol (VITAMIN D-3) 25 MCG (1000 UT) CAPS Take by mouth      methocarbamol (ROBAXIN) 500 MG tablet Take 500 mg by mouth 3 times daily as needed      Morphine Sulfate ER 15 MG T12A Take 15 mg by mouth every 4 hours as needed. Maximum of 41/2 doses daily.  methylphenidate (RITALIN) 10 MG tablet Take 20 mg by mouth 2 times daily.  testosterone (ANDROGEL) 25 MG/2.5GM (1%) GEL 1 % gel Apply 5 g topically daily. 2 pumps once a day.  pramipexole (MIRAPEX) 0.25 MG tablet Take 0.25 mg by mouth 3 times daily Take 1 at 5pm, and 9 at bedtime as needed and one later at night as needed. May take 1 tablet prn for breakthrough symptoms of restless leg syndrome      DULoxetine (CYMBALTA) 60 MG extended release capsule Take 90 mg by mouth daily       aspirin 81 MG EC tablet Take 81 mg by mouth daily      pantoprazole (PROTONIX) 20 MG tablet Take 20 mg by mouth daily      metFORMIN (GLUCOPHAGE) 500 MG tablet Take 500 mg by mouth 2 times daily (with meals)      pravastatin (PRAVACHOL) 80 MG tablet Take 80 mg by mouth daily      melatonin 3 MG TABS tablet Take 6 mg by mouth nightly May take 1 additional tablet in 1 hour if no effect      nicotine (NICODERM CQ) 14 MG/24HR Place 1 patch onto the skin every 24 hours      docusate sodium (COLACE) 100 MG capsule Take 100 mg by mouth 2 times daily      pregabalin (LYRICA) 100 MG capsule Take 2 capsules by mouth 2 times daily for 30 days. . (Patient not taking: Reported on 2/21/2020) 6 capsule 0     No current facility-administered medications for this visit.         Assessment and plan:     shortness of breath and fatigue and dizziness  -Improved  - lethargy is possibly due to inactivity and off testosterone   -Unable to get echo today d/t insurance     Atrial fibrillation,and flutter  -S/p redo ablation in 2/2019 and cardioversion in 2/2020  -Had No recurrence until 2/2020 when he has had symptomatic episodes suggestive of atrial tachycardia   -Has had episodes which were symptomatic and suggestive of atrial tachycardia   -He was put on a course of amiodarone which he completed and has had only 2 episodes of atrial tachycardia/atrial flutter longest 1.5 minutes  -Will see if he has recurrence afterwards and consider ablation  -Nilsa was turned on  -OK to restart testosterone at half a dose and monitor for afib recurrence    HTN  Vitals:    07/29/20 1555   BP: 121/80   Pulse: 103   -Home BP monitoring encourage with a BP goal <130/80        Bradycardia  -S/p Permanent pacemaker   -The CIED was interrogated and programmed and I supervised and reviewed all the data. All findings and changes are in device interrogation sheat and reflect my personal interpretation and changes and is scanned to Epic.   -Total  <0.1%, AP 77% 3.8 years remaining battery  -We will contact the [facility] regarding his billing issues     Plan:    Have Echo completed we will arrange through the [facility]  Schedule follow up after echo (dependent upon [facility] approval)    Thank you for allowing me to participate in the care of Yobani Evans. Further evaluation will be based upon the patient's clinical course and testing results. All questions and concerns were addressed to the patient/family. Alternatives to my treatment were discussed. I have discussed the above stated plan and the patient verbalized understanding and agreed with the plan. NOTE: This report was transcribed using voice recognition software. Every effort was made to ensure accuracy, however, inadvertent computerized transcription errors may be present. Raiza Casillas MD, [facility] [address] [facility]   Office: [phone]     Scribe attestation:  This note was scribed in the presence of Raiza Casillas MD by Perla Lange RN    IDr. Raiza personally performed the services described in this documentation as scribed by RN in my presence, and it is both accurate and complete.

## 2020-07-29 ENCOUNTER — NURSE ONLY (OUTPATIENT)
Dept: CARDIOLOGY CLINIC | Age: 75
End: 2020-07-29
Payer: OTHER GOVERNMENT

## 2020-07-29 ENCOUNTER — OFFICE VISIT (OUTPATIENT)
Dept: CARDIOLOGY CLINIC | Age: 75
End: 2020-07-29
Payer: OTHER GOVERNMENT

## 2020-07-29 VITALS
HEIGHT: 72 IN | WEIGHT: 226 LBS | BODY MASS INDEX: 30.61 KG/M2 | DIASTOLIC BLOOD PRESSURE: 80 MMHG | SYSTOLIC BLOOD PRESSURE: 121 MMHG | HEART RATE: 103 BPM

## 2020-07-29 PROCEDURE — 99214 OFFICE O/P EST MOD 30 MIN: CPT | Performed by: INTERNAL MEDICINE

## 2020-07-29 PROCEDURE — 93280 PM DEVICE PROGR EVAL DUAL: CPT | Performed by: INTERNAL MEDICINE

## 2024-08-17 NOTE — CONSULTS
Socioeconomic History    Marital status:      Spouse name: Delilah Crain Number of children: Not on file    Years of education: Not on file    Highest education level: Not on file   Occupational History    Not on file   Social Needs    Financial resource strain: Not on file    Food insecurity:     Worry: Not on file     Inability: Not on file    Transportation needs:     Medical: Not on file     Non-medical: Not on file   Tobacco Use    Smoking status: Former Smoker    Smokeless tobacco: Never Used    Tobacco comment: nicotine patch    Substance and Sexual Activity    Alcohol use: Yes     Comment: occasional    Drug use: No    Sexual activity: Not on file   Lifestyle    Physical activity:     Days per week: Not on file     Minutes per session: Not on file    Stress: Not on file   Relationships    Social connections:     Talks on phone: Not on file     Gets together: Not on file     Attends [employer] service: Not on file     Active member of club or organization: Not on file     Attends meetings of clubs or organizations: Not on file     Relationship status: Not on file    Intimate partner violence:     Fear of current or ex partner: Not on file     Emotionally abused: Not on file     Physically abused: Not on file     Forced sexual activity: Not on file   Other Topics Concern    Not on file   Social History Narrative    Not on file        Family History:  No evidence for sudden cardiac death or premature CAD.       Problem Relation Age of Onset    Stroke Mother     Heart Attack Father     Heart Attack Brother        Medications:  apixaban (ELIQUIS) tablet 5 mg, BID  aspirin EC tablet 81 mg, Daily  DULoxetine (CYMBALTA) extended release capsule 90 mg, Daily  levothyroxine (SYNTHROID) tablet 100 mcg, Daily  methylphenidate (RITALIN) tablet 20 mg, BID  metoprolol succinate (TOPROL XL) extended release tablet 50 mg, BID  pantoprazole (PROTONIX) tablet 20 mg, Daily  pravastatin (PRAVACHOL) Dahiana Teague (follow up had been arranged in office to discuss antiarrhythmic vs. repeat ablation following normal stress testing and uptitration of beta blocker by other Electrophysiologist last admission). We agreed to short term Amio course (start load today with expected DC tomorrow) with follow up with Dr. Dahiana Teague to be arranged when office opens. Thank you for allowing me to participate in the care of your patient. Please do not hesitate to call. Doreen Serrano D.O., [facility]  Interventional Cardiology     o: [phone]  [facility] [employer] [facility] [address], [address]        NOTE:  This report was transcribed using voice recognition software. Every effort was made to ensure accuracy; however, inadvertent computerized transcription errors may be present. 17-Aug-2024 02:49

## 2025-03-28 ENCOUNTER — TRANSCRIBE ORDERS (OUTPATIENT)
Dept: ADMINISTRATIVE | Age: 80
End: 2025-03-28

## 2025-03-28 DIAGNOSIS — H49.22 SIXTH NERVE PALSY OF LEFT EYE: Primary | ICD-10-CM

## 2025-04-01 ENCOUNTER — HOSPITAL ENCOUNTER (OUTPATIENT)
Dept: MRI IMAGING | Age: 80
Discharge: HOME | End: 2025-04-01
Payer: OTHER GOVERNMENT

## 2025-04-01 ENCOUNTER — HOSPITAL ENCOUNTER (OUTPATIENT)
Dept: GENERAL RADIOLOGY | Age: 80
Discharge: HOME | End: 2025-04-01
Payer: OTHER GOVERNMENT

## 2025-04-01 VITALS — DIASTOLIC BLOOD PRESSURE: 60 MMHG | OXYGEN SATURATION: 93 % | SYSTOLIC BLOOD PRESSURE: 124 MMHG | HEART RATE: 100 BPM

## 2025-04-01 DIAGNOSIS — H49.22 SIXTH NERVE PALSY OF LEFT EYE: ICD-10-CM

## 2025-04-01 PROCEDURE — 6360000004 HC RX CONTRAST MEDICATION

## 2025-04-01 PROCEDURE — 71046 X-RAY EXAM CHEST 2 VIEWS: CPT

## 2025-04-01 PROCEDURE — 70553 MRI BRAIN STEM W/O & W/DYE: CPT

## 2025-04-01 PROCEDURE — A9579 GAD-BASE MR CONTRAST NOS,1ML: HCPCS

## 2025-04-01 RX ADMIN — GADOTERIDOL 20 ML: 279.3 INJECTION, SOLUTION INTRAVENOUS at 17:07
